# Patient Record
Sex: MALE | Race: WHITE | NOT HISPANIC OR LATINO
[De-identification: names, ages, dates, MRNs, and addresses within clinical notes are randomized per-mention and may not be internally consistent; named-entity substitution may affect disease eponyms.]

---

## 2017-01-25 ENCOUNTER — TRANSCRIPTION ENCOUNTER (OUTPATIENT)
Age: 42
End: 2017-01-25

## 2017-08-21 ENCOUNTER — TRANSCRIPTION ENCOUNTER (OUTPATIENT)
Age: 42
End: 2017-08-21

## 2017-08-28 ENCOUNTER — EMERGENCY (EMERGENCY)
Facility: HOSPITAL | Age: 42
LOS: 1 days | Discharge: HOME | End: 2017-08-28
Attending: EMERGENCY MEDICINE | Admitting: EMERGENCY MEDICINE
Payer: COMMERCIAL

## 2017-08-28 VITALS
RESPIRATION RATE: 16 BRPM | TEMPERATURE: 98 F | HEART RATE: 99 BPM | OXYGEN SATURATION: 96 % | WEIGHT: 184.97 LBS | DIASTOLIC BLOOD PRESSURE: 79 MMHG | SYSTOLIC BLOOD PRESSURE: 125 MMHG | HEIGHT: 70 IN

## 2017-08-28 VITALS
RESPIRATION RATE: 14 BRPM | SYSTOLIC BLOOD PRESSURE: 133 MMHG | OXYGEN SATURATION: 99 % | DIASTOLIC BLOOD PRESSURE: 80 MMHG | HEART RATE: 80 BPM | TEMPERATURE: 98 F

## 2017-08-28 DIAGNOSIS — Z98.890 OTHER SPECIFIED POSTPROCEDURAL STATES: Chronic | ICD-10-CM

## 2017-08-28 LAB
ALBUMIN SERPL ELPH-MCNC: 3.9 G/DL — SIGNIFICANT CHANGE UP (ref 3.3–5)
ALP SERPL-CCNC: 56 U/L — SIGNIFICANT CHANGE UP (ref 30–120)
ALT FLD-CCNC: 27 U/L DA — SIGNIFICANT CHANGE UP (ref 10–60)
ANION GAP SERPL CALC-SCNC: 7 MMOL/L — SIGNIFICANT CHANGE UP (ref 5–17)
APTT BLD: 35.9 SEC — SIGNIFICANT CHANGE UP (ref 27.5–37.4)
AST SERPL-CCNC: 23 U/L — SIGNIFICANT CHANGE UP (ref 10–40)
BASOPHILS # BLD AUTO: 0.1 K/UL — SIGNIFICANT CHANGE UP (ref 0–0.2)
BASOPHILS NFR BLD AUTO: 0.6 % — SIGNIFICANT CHANGE UP (ref 0–2)
BILIRUB SERPL-MCNC: 0.8 MG/DL — SIGNIFICANT CHANGE UP (ref 0.2–1.2)
BUN SERPL-MCNC: 10 MG/DL — SIGNIFICANT CHANGE UP (ref 7–23)
CALCIUM SERPL-MCNC: 9.1 MG/DL — SIGNIFICANT CHANGE UP (ref 8.4–10.5)
CHLORIDE SERPL-SCNC: 106 MMOL/L — SIGNIFICANT CHANGE UP (ref 96–108)
CO2 SERPL-SCNC: 29 MMOL/L — SIGNIFICANT CHANGE UP (ref 22–31)
CREAT SERPL-MCNC: 0.78 MG/DL — SIGNIFICANT CHANGE UP (ref 0.5–1.3)
EOSINOPHIL # BLD AUTO: 0.2 K/UL — SIGNIFICANT CHANGE UP (ref 0–0.5)
EOSINOPHIL NFR BLD AUTO: 1.8 % — SIGNIFICANT CHANGE UP (ref 0–6)
GLUCOSE SERPL-MCNC: 94 MG/DL — SIGNIFICANT CHANGE UP (ref 70–99)
HCT VFR BLD CALC: 48.1 % — SIGNIFICANT CHANGE UP (ref 39–50)
HGB BLD-MCNC: 16.7 G/DL — SIGNIFICANT CHANGE UP (ref 13–17)
INR BLD: 1.15 RATIO — SIGNIFICANT CHANGE UP (ref 0.88–1.16)
LIDOCAIN IGE QN: 245 U/L — SIGNIFICANT CHANGE UP (ref 73–393)
LYMPHOCYTES # BLD AUTO: 1.3 K/UL — SIGNIFICANT CHANGE UP (ref 1–3.3)
LYMPHOCYTES # BLD AUTO: 14.5 % — SIGNIFICANT CHANGE UP (ref 13–44)
MCHC RBC-ENTMCNC: 31.6 PG — SIGNIFICANT CHANGE UP (ref 27–34)
MCHC RBC-ENTMCNC: 34.8 GM/DL — SIGNIFICANT CHANGE UP (ref 32–36)
MCV RBC AUTO: 91 FL — SIGNIFICANT CHANGE UP (ref 80–100)
MONOCYTES # BLD AUTO: 0.5 K/UL — SIGNIFICANT CHANGE UP (ref 0–0.9)
MONOCYTES NFR BLD AUTO: 6 % — SIGNIFICANT CHANGE UP (ref 2–14)
NEUTROPHILS # BLD AUTO: 6.9 K/UL — SIGNIFICANT CHANGE UP (ref 1.8–7.4)
NEUTROPHILS NFR BLD AUTO: 77.1 % — HIGH (ref 43–77)
PLATELET # BLD AUTO: 170 K/UL — SIGNIFICANT CHANGE UP (ref 150–400)
POTASSIUM SERPL-MCNC: 4 MMOL/L — SIGNIFICANT CHANGE UP (ref 3.5–5.3)
POTASSIUM SERPL-SCNC: 4 MMOL/L — SIGNIFICANT CHANGE UP (ref 3.5–5.3)
PROT SERPL-MCNC: 7.9 G/DL — SIGNIFICANT CHANGE UP (ref 6–8.3)
PROTHROM AB SERPL-ACNC: 12.6 SEC — SIGNIFICANT CHANGE UP (ref 9.8–12.7)
RBC # BLD: 5.28 M/UL — SIGNIFICANT CHANGE UP (ref 4.2–5.8)
RBC # FLD: 10.9 % — SIGNIFICANT CHANGE UP (ref 10.3–14.5)
SODIUM SERPL-SCNC: 142 MMOL/L — SIGNIFICANT CHANGE UP (ref 135–145)
WBC # BLD: 8.9 K/UL — SIGNIFICANT CHANGE UP (ref 3.8–10.5)
WBC # FLD AUTO: 8.9 K/UL — SIGNIFICANT CHANGE UP (ref 3.8–10.5)

## 2017-08-28 PROCEDURE — 80053 COMPREHEN METABOLIC PANEL: CPT

## 2017-08-28 PROCEDURE — 85730 THROMBOPLASTIN TIME PARTIAL: CPT

## 2017-08-28 PROCEDURE — 74177 CT ABD & PELVIS W/CONTRAST: CPT

## 2017-08-28 PROCEDURE — 74177 CT ABD & PELVIS W/CONTRAST: CPT | Mod: 26

## 2017-08-28 PROCEDURE — 85027 COMPLETE CBC AUTOMATED: CPT

## 2017-08-28 PROCEDURE — 99284 EMERGENCY DEPT VISIT MOD MDM: CPT | Mod: 25

## 2017-08-28 PROCEDURE — 85610 PROTHROMBIN TIME: CPT

## 2017-08-28 PROCEDURE — 99284 EMERGENCY DEPT VISIT MOD MDM: CPT

## 2017-08-28 PROCEDURE — 83690 ASSAY OF LIPASE: CPT

## 2017-08-28 PROCEDURE — 36415 COLL VENOUS BLD VENIPUNCTURE: CPT

## 2017-08-28 RX ORDER — SODIUM CHLORIDE 9 MG/ML
1000 INJECTION INTRAMUSCULAR; INTRAVENOUS; SUBCUTANEOUS
Qty: 0 | Refills: 0 | Status: COMPLETED | OUTPATIENT
Start: 2017-08-28 | End: 2017-08-28

## 2017-08-28 RX ORDER — IOHEXOL 300 MG/ML
30 INJECTION, SOLUTION INTRAVENOUS ONCE
Qty: 0 | Refills: 0 | Status: COMPLETED | OUTPATIENT
Start: 2017-08-28 | End: 2017-08-28

## 2017-08-28 RX ADMIN — SODIUM CHLORIDE 2000 MILLILITER(S): 9 INJECTION INTRAMUSCULAR; INTRAVENOUS; SUBCUTANEOUS at 17:28

## 2017-08-28 RX ADMIN — SODIUM CHLORIDE 2000 MILLILITER(S): 9 INJECTION INTRAMUSCULAR; INTRAVENOUS; SUBCUTANEOUS at 17:29

## 2017-08-28 RX ADMIN — IOHEXOL 30 MILLILITER(S): 300 INJECTION, SOLUTION INTRAVENOUS at 17:21

## 2017-08-28 NOTE — ED PROVIDER NOTE - MEDICAL DECISION MAKING DETAILS
pt with months of abd pain and discomfort now with rectal bleeding and increased cramping check labs, guaiac stool - CT abd r/o colitis vs. mass

## 2017-08-28 NOTE — ED PROVIDER NOTE - OBJECTIVE STATEMENT
41 y/o M pt w/ PMHx of diverticulitis presents to the ED c/o lower abd pain and discomfort for the past couple of months. Pt states for the past couple of days his BM's have intermittently been bloody, soft and sometimes mucusy and he has been experiencing worsening cramping. Pt took some Pepto bismol yesterday and noticed black stools today. Pt denies fever, chills, nausea or any other complaints at this time.

## 2017-10-12 ENCOUNTER — TRANSCRIPTION ENCOUNTER (OUTPATIENT)
Age: 42
End: 2017-10-12

## 2017-12-15 ENCOUNTER — APPOINTMENT (OUTPATIENT)
Dept: OTOLARYNGOLOGY | Facility: CLINIC | Age: 42
End: 2017-12-15

## 2018-01-22 ENCOUNTER — TRANSCRIPTION ENCOUNTER (OUTPATIENT)
Age: 43
End: 2018-01-22

## 2018-02-21 ENCOUNTER — TRANSCRIPTION ENCOUNTER (OUTPATIENT)
Age: 43
End: 2018-02-21

## 2018-02-21 ENCOUNTER — EMERGENCY (EMERGENCY)
Facility: HOSPITAL | Age: 43
LOS: 1 days | Discharge: HOME | End: 2018-02-21
Attending: EMERGENCY MEDICINE | Admitting: EMERGENCY MEDICINE
Payer: COMMERCIAL

## 2018-02-21 VITALS
OXYGEN SATURATION: 100 % | DIASTOLIC BLOOD PRESSURE: 80 MMHG | TEMPERATURE: 98 F | HEART RATE: 80 BPM | SYSTOLIC BLOOD PRESSURE: 125 MMHG | RESPIRATION RATE: 16 BRPM

## 2018-02-21 VITALS
DIASTOLIC BLOOD PRESSURE: 89 MMHG | HEIGHT: 70 IN | OXYGEN SATURATION: 99 % | WEIGHT: 179.9 LBS | HEART RATE: 84 BPM | TEMPERATURE: 98 F | RESPIRATION RATE: 16 BRPM | SYSTOLIC BLOOD PRESSURE: 143 MMHG

## 2018-02-21 DIAGNOSIS — Z98.890 OTHER SPECIFIED POSTPROCEDURAL STATES: Chronic | ICD-10-CM

## 2018-02-21 DIAGNOSIS — L03.113 CELLULITIS OF RIGHT UPPER LIMB: ICD-10-CM

## 2018-02-21 LAB
ALBUMIN SERPL ELPH-MCNC: 3.9 G/DL — SIGNIFICANT CHANGE UP (ref 3.3–5)
ALP SERPL-CCNC: 58 U/L — SIGNIFICANT CHANGE UP (ref 30–120)
ALT FLD-CCNC: 27 U/L DA — SIGNIFICANT CHANGE UP (ref 10–60)
ANION GAP SERPL CALC-SCNC: 7 MMOL/L — SIGNIFICANT CHANGE UP (ref 5–17)
AST SERPL-CCNC: 23 U/L — SIGNIFICANT CHANGE UP (ref 10–40)
BASOPHILS # BLD AUTO: 0.1 K/UL — SIGNIFICANT CHANGE UP (ref 0–0.2)
BASOPHILS NFR BLD AUTO: 1.4 % — SIGNIFICANT CHANGE UP (ref 0–2)
BILIRUB SERPL-MCNC: 0.6 MG/DL — SIGNIFICANT CHANGE UP (ref 0.2–1.2)
BUN SERPL-MCNC: 16 MG/DL — SIGNIFICANT CHANGE UP (ref 7–23)
CALCIUM SERPL-MCNC: 9.1 MG/DL — SIGNIFICANT CHANGE UP (ref 8.4–10.5)
CHLORIDE SERPL-SCNC: 104 MMOL/L — SIGNIFICANT CHANGE UP (ref 96–108)
CO2 SERPL-SCNC: 29 MMOL/L — SIGNIFICANT CHANGE UP (ref 22–31)
CREAT SERPL-MCNC: 0.82 MG/DL — SIGNIFICANT CHANGE UP (ref 0.5–1.3)
EOSINOPHIL # BLD AUTO: 0.3 K/UL — SIGNIFICANT CHANGE UP (ref 0–0.5)
EOSINOPHIL NFR BLD AUTO: 3.7 % — SIGNIFICANT CHANGE UP (ref 0–6)
GLUCOSE SERPL-MCNC: 89 MG/DL — SIGNIFICANT CHANGE UP (ref 70–99)
HCT VFR BLD CALC: 50.2 % — HIGH (ref 39–50)
HGB BLD-MCNC: 16.9 G/DL — SIGNIFICANT CHANGE UP (ref 13–17)
LYMPHOCYTES # BLD AUTO: 2 K/UL — SIGNIFICANT CHANGE UP (ref 1–3.3)
LYMPHOCYTES # BLD AUTO: 22.1 % — SIGNIFICANT CHANGE UP (ref 13–44)
MCHC RBC-ENTMCNC: 30.1 PG — SIGNIFICANT CHANGE UP (ref 27–34)
MCHC RBC-ENTMCNC: 33.6 GM/DL — SIGNIFICANT CHANGE UP (ref 32–36)
MCV RBC AUTO: 89.6 FL — SIGNIFICANT CHANGE UP (ref 80–100)
MONOCYTES # BLD AUTO: 0.5 K/UL — SIGNIFICANT CHANGE UP (ref 0–0.9)
MONOCYTES NFR BLD AUTO: 5.8 % — SIGNIFICANT CHANGE UP (ref 2–14)
NEUTROPHILS # BLD AUTO: 5.9 K/UL — SIGNIFICANT CHANGE UP (ref 1.8–7.4)
NEUTROPHILS NFR BLD AUTO: 66.9 % — SIGNIFICANT CHANGE UP (ref 43–77)
PLATELET # BLD AUTO: 172 K/UL — SIGNIFICANT CHANGE UP (ref 150–400)
POTASSIUM SERPL-MCNC: 4.2 MMOL/L — SIGNIFICANT CHANGE UP (ref 3.5–5.3)
POTASSIUM SERPL-SCNC: 4.2 MMOL/L — SIGNIFICANT CHANGE UP (ref 3.5–5.3)
PROT SERPL-MCNC: 7.8 G/DL — SIGNIFICANT CHANGE UP (ref 6–8.3)
RBC # BLD: 5.61 M/UL — SIGNIFICANT CHANGE UP (ref 4.2–5.8)
RBC # FLD: 11.4 % — SIGNIFICANT CHANGE UP (ref 10.3–14.5)
SODIUM SERPL-SCNC: 140 MMOL/L — SIGNIFICANT CHANGE UP (ref 135–145)
WBC # BLD: 8.9 K/UL — SIGNIFICANT CHANGE UP (ref 3.8–10.5)
WBC # FLD AUTO: 8.9 K/UL — SIGNIFICANT CHANGE UP (ref 3.8–10.5)

## 2018-02-21 PROCEDURE — 99284 EMERGENCY DEPT VISIT MOD MDM: CPT | Mod: 25

## 2018-02-21 PROCEDURE — 96365 THER/PROPH/DIAG IV INF INIT: CPT

## 2018-02-21 PROCEDURE — 80053 COMPREHEN METABOLIC PANEL: CPT

## 2018-02-21 PROCEDURE — 99283 EMERGENCY DEPT VISIT LOW MDM: CPT

## 2018-02-21 PROCEDURE — 85027 COMPLETE CBC AUTOMATED: CPT

## 2018-02-21 PROCEDURE — 96375 TX/PRO/DX INJ NEW DRUG ADDON: CPT

## 2018-02-21 RX ORDER — SODIUM CHLORIDE 9 MG/ML
1000 INJECTION INTRAMUSCULAR; INTRAVENOUS; SUBCUTANEOUS ONCE
Qty: 0 | Refills: 0 | Status: COMPLETED | OUTPATIENT
Start: 2018-02-21 | End: 2018-02-21

## 2018-02-21 RX ORDER — DIPHENHYDRAMINE HCL 50 MG
25 CAPSULE ORAL ONCE
Qty: 0 | Refills: 0 | Status: COMPLETED | OUTPATIENT
Start: 2018-02-21 | End: 2018-02-21

## 2018-02-21 RX ORDER — CEPHALEXIN 500 MG
1 CAPSULE ORAL
Qty: 28 | Refills: 0 | OUTPATIENT
Start: 2018-02-21 | End: 2018-02-27

## 2018-02-21 RX ORDER — KETOROLAC TROMETHAMINE 30 MG/ML
30 SYRINGE (ML) INJECTION ONCE
Qty: 0 | Refills: 0 | Status: DISCONTINUED | OUTPATIENT
Start: 2018-02-21 | End: 2018-02-21

## 2018-02-21 RX ADMIN — Medication 100 MILLIGRAM(S): at 16:06

## 2018-02-21 RX ADMIN — Medication 30 MILLIGRAM(S): at 16:14

## 2018-02-21 RX ADMIN — Medication 25 MILLIGRAM(S): at 16:06

## 2018-02-21 RX ADMIN — SODIUM CHLORIDE 1000 MILLILITER(S): 9 INJECTION INTRAMUSCULAR; INTRAVENOUS; SUBCUTANEOUS at 16:05

## 2018-02-21 RX ADMIN — Medication 1 APPLICATION(S): at 16:14

## 2018-02-21 RX ADMIN — Medication 30 MILLIGRAM(S): at 16:07

## 2018-02-21 NOTE — ED PROVIDER NOTE - PHYSICAL EXAMINATION
3 X 4 CM SOFT TISSUE SWELLING TO THE PROXIMAL RIGHT FOREARM WITH SURROUNDING ERYTHEMA AND WARMTH, CENTRAL PUNCTUM NOTED, LOCALIZED TENDERNESS  STREAKING UP THE RIGHT ARM TO BELOW THE SHOULDER   FULL ROM OF THE RIGHT ARM   MUSCLE STRENGTH 5/5   SENSATION AND PULSE INTACT TO THE RUE

## 2018-02-21 NOTE — ED PROVIDER NOTE - OBJECTIVE STATEMENT
43 year old male c/o right arm pain and swelling since last night.  Pt states he was at a friend house and he was bitten by an insect.  States he noted swelling and itching immediately.  He tried squeezing the area assuming it was a spider with no relief.  States he woke up this morning with streaking up the arm and significant swelling.  Pt was sent here by [facility].  Admits to pain and itching to the area.  Denies fever, chills, decreased ROM of the right arm, numbness or tingling

## 2018-02-21 NOTE — ED ADULT NURSE NOTE - OBJECTIVE STATEMENT
Patient c/o right forearm pain, spreading redness and swelling from insect bite he got  last night.  insect.  States he noted swelling and itching immediately.  He Reports squeezing the area last night when he was bitten and  woke up this morning with streaking up the arm and significant swelling.

## 2018-02-21 NOTE — ED PROVIDER NOTE - CHPI ED SYMPTOMS NEG
no chills/no decreased eating/drinking/no numbness/no nausea/no tingling/no vomiting/no dizziness/no fever/no weakness

## 2018-02-21 NOTE — ED PROVIDER NOTE - ATTENDING CONTRIBUTION TO CARE
as per progress note.  Also, appears as lymphangitis with small area of indurated area, nonfluctuant.  Consider combination of localized allergic rxn with lymphangitis.  Nontoxic, trial outpt therapy.

## 2018-02-21 NOTE — ED ADULT NURSE REASSESSMENT NOTE - NS ED NURSE REASSESS COMMENT FT1
Nausea resolved Dizziness returns if patient moves his head to the right or if he sits up.
Patient's circular bite area with surrounding redness, edema and streaking up to upper arm has lessened. Pain has also improved. Patient instructed on signs and symptoms to be vigilant of and to return to the nearest ED if needed.

## 2018-02-21 NOTE — CONSULT NOTE ADULT - SUBJECTIVE AND OBJECTIVE BOX
HPI: 43 year old male c/o right arm pain and swelling since last night.  Pt states he was at a friend house and he was bitten by an insect.  States he noted swelling and itching immediately.  He tried squeezing the area assuming it was a spider with no relief.  States he woke up this morning with streaking up the arm and significant swelling.  Pt was sent here by [facility] [place].  Admits to pain and itching to the area.  Right arm feels heavy. Denies fever, chills, decreased ROM of the right arm, numbness or tingling      PAST MEDICAL & SURGICAL HISTORY:  Deaf, right  Inderjit Guerra infection  Scoliosis  Barretts esophagus  Diverticulitis  H/O Spinal surgery      REVIEW OF SYSTEMS:    CONSTITUTIONAL: No fever, no weight loss, no fatigue  EYES: No eye pain, no visual disturbances  ENMT:  No difficulty hearing, no tinnitus, no vertigo; No sinus or throat pain  NECK: No pain or stiffness  RESPIRATORY: No cough, no wheezing, no chills, no hemoptysis; No shortness of breath  CARDIOVASCULAR: No chest pain, palpitations, dizziness, or leg swelling  GASTROINTESTINAL: No abdominal pain. No nausea, no vomiting, no hematemesis; No diarrhea, no constipation. No melena, no hematochezia.  GENITOURINARY: No dysuria, no frequency, no hematuria, no incontinence  NEUROLOGICAL: No headaches, no memory loss, no loss of strength, no numbness, no tremors  SKIN: + itching RUE, no burning, no rashes   LYMPH NODES: No enlarged glands  ENDOCRINE: No heat, no cold intolerance; No hair loss  MUSCULOSKELETAL: No joint pain, no swelling; No muscle pain, no back pain, RUE feels heavy with redness  PSYCHIATRIC: No depression, no anxiety, no mood swings, no difficulty sleeping  HEME/LYMPH: No easy bruising, no bleeding gums  ALLERGY AND IMMUNOLOGIC: No hives, no eczema      MEDICATIONS  (STANDING):    MEDICATIONS  (PRN):      Allergies    No Known Allergies    Intolerances        SOCIAL HISTORY:    FAMILY HISTORY:      Vital Signs Last 24 Hrs  T(C): 36.5 (21 Feb 2018 18:00), Max: 36.9 (21 Feb 2018 14:37)  T(F): 97.7 (21 Feb 2018 18:00), Max: 98.5 (21 Feb 2018 14:37)  HR: 80 (21 Feb 2018 18:00) (80 - 84)  BP: 125/80 (21 Feb 2018 18:00) (125/80 - 143/89)  BP(mean): --  RR: 16 (21 Feb 2018 18:00) (16 - 16)  SpO2: 100% (21 Feb 2018 18:00) (99% - 100%)    PHYSICAL EXAM:    GENERAL: NAD  HEAD:  Atraumatic, Normocephalic  EYES: EOMI, PERRLA, conjunctiva and sclera clear  ENMT: No tonsillar erythema, exudates, or enlargement; Moist mucous membranes  NECK: Supple, No JVD, Normal thyroid  NERVOUS SYSTEM:  Alert & Oriented X3, Motor Strength 5/5 B/L upper and lower extremities;  CHEST/LUNG: Clear to percussion bilaterally; No rales, rhonchi, wheezing, or rubs  HEART: Regular rate and rhythm; No murmurs, rubs, or gallops  ABDOMEN: Soft, Nontender, Nondistended; Bowel sounds present  EXTREMITIES:  RUE: redness and swelling of the right proximal forearm with redness streaking up arm, tender, FROM, sensation intact  LYMPH: No lymphadenopathy   SKIN: No rashes or lesions      LABS:                        16.9   8.9   )-----------( 172      ( 21 Feb 2018 16:01 )             50.2     02-21    140  |  104  |  16  ----------------------------<  89  4.2   |  29  |  0.82    Ca    9.1      21 Feb 2018 16:01    TPro  7.8  /  Alb  3.9  /  TBili  0.6  /  DBili  x   /  AST  23  /  ALT  27  /  AlkPhos  58  02-21          RADIOLOGY & ADDITIONAL STUDIES:

## 2018-02-21 NOTE — ED PROVIDER NOTE - MEDICAL DECISION MAKING DETAILS
labs, abx, benadryl and kenalog   outpatient treatment with Keflex qid x 7 days   pt will need to follow up in 2 days for wound check

## 2018-02-21 NOTE — ED PROVIDER NOTE - PROGRESS NOTE DETAILS
Attending Contribution to Care: pt presents with redness, itchiness and pain right forearm, he states he got bit by an insect yesterday at his friends house, unknown what insect.   PE: 5cm irregular  circular shape erythema right forearm, no central necrosis, streak going up right upper extremity   MDM: medication discussed labs with the patient.  pt has tube of kenalog with him, will apply twice daily   Keflex qid x 7 days discussed labs with the patient.  pt has tube of kenalog with him, will apply twice daily   Keflex qid x 7 days  swelling is decreased and streaking is less

## 2018-02-21 NOTE — CONSULT NOTE ADULT - PROBLEM SELECTOR RECOMMENDATION 9
-Cellulitis and lymphangitis of right forearm/arm secondary to possible insect bite.  -It would be reasonable to admit patient for iv antibiotics as the extent of his cellulitis and lymphangitis seems significant despite normal WBC and normal vitals.  -I will defer the decision to admit or not to the ER attending, Dr. Grimes. If patient goes home on po abx, he will need close follow-up.  - Advanced care planning discussed with patient/family. Advanced care planning forms reviewed/discussed/completed. 20 minutes spent.

## 2018-03-21 NOTE — ED PROVIDER NOTE - SKIN, MLM
ACCOMPANIED BY: father   FEEDING: milk - 2%                       Diet - good variety of foods  STOOLS:  normal  SCHOOL HISTORY:       [employer]       Grade - 3       Academic performance - normal       Extracurricular Activities - YES    CONCERNS:  None  Medications verified, no changes.  Denies known Latex allergy or symptoms of Latex sensitivity.  Immunization History   Administered Date(s) Administered   • DTaP 03/14/2014   • DTaP/HIB/IPV 2009, 2009, 2009, 06/10/2010   • Hep A, ped/adol, 2 dose 06/10/2010, 03/10/2011   • Hep B, adolescent or pediatric 2009, 2009, 2009   • Influenza, injectable, quadrivalent, preservative-free 03/20/2017   • Influenza, live, intranasal, quadrivalent 03/17/2016   • Influenza, seasonal, injectable, trivalent 2009, 2009, 09/09/2010, 10/31/2012   • MMR 03/10/2010   • Measles Mumps Rubella Varicella 03/14/2014   • Novel Influenza [id number], Unspecified Formulation 2009, 01/13/2010   • Pneumococcal Conjugate 2009, 2009, 2009, 03/10/2010, 06/10/2010   • Polio, INACTIVATED 03/14/2014   • Rotavirus 2009, 2009, 2009   • Varicella 03/10/2010            Skin normal color for race, warm, dry and intact.

## 2018-05-06 ENCOUNTER — EMERGENCY (EMERGENCY)
Facility: HOSPITAL | Age: 43
LOS: 1 days | Discharge: HOME | End: 2018-05-06
Admitting: EMERGENCY MEDICINE
Payer: COMMERCIAL

## 2018-05-06 DIAGNOSIS — Z98.890 OTHER SPECIFIED POSTPROCEDURAL STATES: Chronic | ICD-10-CM

## 2018-05-06 PROCEDURE — 99283 EMERGENCY DEPT VISIT LOW MDM: CPT

## 2018-06-23 ENCOUNTER — TRANSCRIPTION ENCOUNTER (OUTPATIENT)
Age: 43
End: 2018-06-23

## 2018-07-11 ENCOUNTER — TRANSCRIPTION ENCOUNTER (OUTPATIENT)
Age: 43
End: 2018-07-11

## 2018-07-23 ENCOUNTER — EMERGENCY (EMERGENCY)
Facility: HOSPITAL | Age: 43
LOS: 1 days | Discharge: HOME | End: 2018-07-23
Attending: EMERGENCY MEDICINE | Admitting: EMERGENCY MEDICINE
Payer: COMMERCIAL

## 2018-07-23 VITALS
RESPIRATION RATE: 15 BRPM | TEMPERATURE: 98 F | DIASTOLIC BLOOD PRESSURE: 81 MMHG | HEART RATE: 94 BPM | WEIGHT: 179.9 LBS | SYSTOLIC BLOOD PRESSURE: 123 MMHG | OXYGEN SATURATION: 97 %

## 2018-07-23 DIAGNOSIS — Z98.890 OTHER SPECIFIED POSTPROCEDURAL STATES: Chronic | ICD-10-CM

## 2018-07-23 PROCEDURE — 99283 EMERGENCY DEPT VISIT LOW MDM: CPT

## 2018-07-23 RX ORDER — CEPHALEXIN 500 MG
500 CAPSULE ORAL ONCE
Qty: 0 | Refills: 0 | Status: COMPLETED | OUTPATIENT
Start: 2018-07-23 | End: 2018-07-23

## 2018-07-23 RX ORDER — CEPHALEXIN 500 MG
1 CAPSULE ORAL
Qty: 28 | Refills: 0
Start: 2018-07-23 | End: 2018-07-29

## 2018-07-23 RX ADMIN — Medication 500 MILLIGRAM(S): at 18:17

## 2018-07-23 NOTE — ED PROVIDER NOTE - SKIN, MLM
multiple lower extremity mosquito bites, the left calf has an appearance of early cellulitis, localized, no fluctuance

## 2018-07-23 NOTE — ED ADULT NURSE REASSESSMENT NOTE - NS ED NURSE REASSESS COMMENT FT1
Pt to ED with insect bites on both legs. Pt states that he was at a friends house watching TV on a chair and was bit by insects. Red swollen welts on B/L calves.

## 2018-07-23 NOTE — ED PROVIDER NOTE - OBJECTIVE STATEMENT
pt with multiple bug bites on the lower extremities mainly on the calves.  pt states that some areas are very red and hot, pt denies any fevers, chills, change in appetite.

## 2018-07-23 NOTE — ED ADULT TRIAGE NOTE - CHIEF COMPLAINT QUOTE
I have an animal bite on both of my legs, and I think its from a spider. The swelling and pain began last night.

## 2018-07-24 PROBLEM — M41.9 SCOLIOSIS, UNSPECIFIED: Chronic | Status: ACTIVE | Noted: 2017-08-28

## 2018-07-24 PROBLEM — K57.92 DIVERTICULITIS OF INTESTINE, PART UNSPECIFIED, WITHOUT PERFORATION OR ABSCESS WITHOUT BLEEDING: Chronic | Status: ACTIVE | Noted: 2017-08-28

## 2018-07-24 PROBLEM — B27.90 INFECTIOUS MONONUCLEOSIS, UNSPECIFIED WITHOUT COMPLICATION: Chronic | Status: ACTIVE | Noted: 2017-08-28

## 2018-07-24 PROBLEM — H91.91 UNSPECIFIED HEARING LOSS, RIGHT EAR: Chronic | Status: ACTIVE | Noted: 2017-08-28

## 2018-07-24 PROBLEM — K22.70 BARRETT'S ESOPHAGUS WITHOUT DYSPLASIA: Chronic | Status: ACTIVE | Noted: 2017-08-28

## 2018-12-12 ENCOUNTER — TRANSCRIPTION ENCOUNTER (OUTPATIENT)
Age: 43
End: 2018-12-12

## 2019-01-30 ENCOUNTER — TRANSCRIPTION ENCOUNTER (OUTPATIENT)
Age: 44
End: 2019-01-30

## 2020-03-09 ENCOUNTER — EMERGENCY (EMERGENCY)
Facility: HOSPITAL | Age: 45
LOS: 1 days | Discharge: HOME | End: 2020-03-09
Attending: EMERGENCY MEDICINE | Admitting: EMERGENCY MEDICINE
Payer: COMMERCIAL

## 2020-03-09 VITALS
DIASTOLIC BLOOD PRESSURE: 91 MMHG | TEMPERATURE: 98 F | RESPIRATION RATE: 20 BRPM | SYSTOLIC BLOOD PRESSURE: 152 MMHG | HEART RATE: 92 BPM | WEIGHT: 184.97 LBS | HEIGHT: 70 IN | OXYGEN SATURATION: 98 %

## 2020-03-09 DIAGNOSIS — Z98.890 OTHER SPECIFIED POSTPROCEDURAL STATES: Chronic | ICD-10-CM

## 2020-03-09 PROCEDURE — 99053 MED SERV 10PM-8AM 24 HR FAC: CPT

## 2020-03-09 PROCEDURE — 99282 EMERGENCY DEPT VISIT SF MDM: CPT

## 2020-03-09 NOTE — ED PROVIDER NOTE - OBJECTIVE STATEMENT
Patient was at a friend's house and felt something bite him on the right forearm about 3 hours ago. Area now red and swollen. Has not taken anything for it. Thinks it looks a little better than before

## 2020-03-10 NOTE — ED ADULT NURSE NOTE - NSIMPLEMENTINTERV_GEN_ALL_ED
Implemented All Universal Safety Interventions:  [place] to call system. Call bell, personal items and telephone within reach. Instruct patient to call for assistance. Room bathroom lighting operational. Non-slip footwear when patient is off stretcher. Physically safe environment: no spills, clutter or unnecessary equipment. Stretcher in lowest position, wheels locked, appropriate side rails in place.

## 2020-03-10 NOTE — ED ADULT NURSE NOTE - OBJECTIVE STATEMENT
46yo male walked into Ed, pt c/o "I think I was bite by a bed bug" as per pt. pt noted with redness and swelling to medial aspect of right forearm.

## 2020-03-20 ENCOUNTER — APPOINTMENT (OUTPATIENT)
Dept: INTERNAL MEDICINE | Facility: CLINIC | Age: 45
End: 2020-03-20

## 2020-06-26 ENCOUNTER — NON-APPOINTMENT (OUTPATIENT)
Age: 45
End: 2020-06-26

## 2020-06-26 ENCOUNTER — APPOINTMENT (OUTPATIENT)
Dept: INTERNAL MEDICINE | Facility: CLINIC | Age: 45
End: 2020-06-26
Payer: COMMERCIAL

## 2020-06-26 VITALS
RESPIRATION RATE: 12 BRPM | OXYGEN SATURATION: 98 % | HEIGHT: 69 IN | SYSTOLIC BLOOD PRESSURE: 119 MMHG | DIASTOLIC BLOOD PRESSURE: 79 MMHG | BODY MASS INDEX: 26.51 KG/M2 | WEIGHT: 179 LBS | HEART RATE: 84 BPM

## 2020-06-26 DIAGNOSIS — K64.9 UNSPECIFIED HEMORRHOIDS: ICD-10-CM

## 2020-06-26 PROCEDURE — 99386 PREV VISIT NEW AGE 40-64: CPT | Mod: 25

## 2020-06-26 PROCEDURE — 93000 ELECTROCARDIOGRAM COMPLETE: CPT

## 2020-06-26 NOTE — PHYSICAL EXAM
[No Acute Distress] : no acute distress [Well Developed] : well developed [Well Nourished] : well nourished [Well-Appearing] : well-appearing [EOMI] : extraocular movements intact [Normal Sclera/Conjunctiva] : normal sclera/conjunctiva [PERRL] : pupils equal round and reactive to light [Normal Outer Ear/Nose] : the outer ears and nose were normal in appearance [No JVD] : no jugular venous distention [Normal Oropharynx] : the oropharynx was normal [Thyroid Normal, No Nodules] : the thyroid was normal and there were no nodules present [No Lymphadenopathy] : no lymphadenopathy [Supple] : supple [No Respiratory Distress] : no respiratory distress  [Clear to Auscultation] : lungs were clear to auscultation bilaterally [No Accessory Muscle Use] : no accessory muscle use [Normal S1, S2] : normal S1 and S2 [Regular Rhythm] : with a regular rhythm [Normal Rate] : normal rate  [No Abdominal Bruit] : a ~M bruit was not heard ~T in the abdomen [No Carotid Bruits] : no carotid bruits [No Murmur] : no murmur heard [Pedal Pulses Present] : the pedal pulses are present [No Edema] : there was no peripheral edema [No Varicosities] : no varicosities [No Palpable Aorta] : no palpable aorta [No Extremity Clubbing/Cyanosis] : no extremity clubbing/cyanosis [Soft] : abdomen soft [No Masses] : no abdominal mass palpated [Non-distended] : non-distended [Non Tender] : non-tender [No HSM] : no HSM [Normal Posterior Cervical Nodes] : no posterior cervical lymphadenopathy [Normal Bowel Sounds] : normal bowel sounds [No CVA Tenderness] : no CVA  tenderness [No Spinal Tenderness] : no spinal tenderness [Normal Anterior Cervical Nodes] : no anterior cervical lymphadenopathy [No Joint Swelling] : no joint swelling [Grossly Normal Strength/Tone] : grossly normal strength/tone [No Rash] : no rash [No Focal Deficits] : no focal deficits [Normal Gait] : normal gait [Coordination Grossly Intact] : coordination grossly intact [Normal Affect] : the affect was normal [Normal Insight/Judgement] : insight and judgment were intact [Deep Tendon Reflexes (DTR)] : deep tendon reflexes were 2+ and symmetric

## 2020-06-26 NOTE — HEALTH RISK ASSESSMENT
[Very Good] : ~his/her~ current health as very good [Good] : ~his/her~  mood as  good [Intercurrent ED visits] : went to ED [Yes] : Yes [1 or 2 (0 pts)] : 1 or 2 (0 points) [2 - 4 times a month (2 pts)] : 2-4 times a month (2 points) [No falls in past year] : Patient reported no falls in the past year [Never (0 pts)] : Never (0 points) [0] : 2) Feeling down, depressed, or hopeless: Not at all (0) [HIV Test offered] : HIV Test offered [None] : None [With Family] : lives with family [Employed] : employed [College] : College [Single] : single [Feels Safe at Home] : Feels safe at home [Reports changes in hearing] : Reports changes in hearing [Fully functional (bathing, dressing, toileting, transferring, walking, feeding)] : Fully functional (bathing, dressing, toileting, transferring, walking, feeding) [Fully functional (using the telephone, shopping, preparing meals, housekeeping, doing laundry, using] : Fully functional and needs no help or supervision to perform IADLs (using the telephone, shopping, preparing meals, housekeeping, doing laundry, using transportation, managing medications and managing finances) [Carbon Monoxide Detector] : carbon monoxide detector [Smoke Detector] : smoke detector [Seat Belt] :  uses seat belt [Safety elements used in home] : safety elements used in home [Sunscreen] : uses sunscreen [Name: ___] : Health Care Proxy's Name: [unfilled]  [Relationship: ___] : Relationship: [unfilled] [FreeTextEntry1] : INtermittent lower back pain, hx. of scolosis [Audit-CScore] : 2 [de-identified] : bug bite [] : No [de-identified] : regular healthy diet no beef [de-identified] : walks [[id number]] : 0 [Change in mental status noted] : No change in mental status noted [Language] : denies difficulty with language [Handling Complex Tasks] : denies difficulty handling complex tasks [Learning/Retaining New Information] : denies difficulty learning/retaining new information [Behavior] : denies difficulty with behavior [Spatial Ability and Orientation] : denies difficulty with spatial ability and orientation [Reasoning] : denies difficulty with reasoning [Sexually Active] : not sexually active [High Risk Behavior] : no high risk behavior [Reports normal functional visual acuity (ie: able to read med bottle)] : Reports poor functional visual acuity.  [Reports changes in vision] : Reports no changes in vision [Reports changes in dental health] : Reports no changes in dental health [TB Exposure] : is not being exposed to tuberculosis [Caregiver Concerns] : does not have caregiver concerns [Travel to Developing Areas] : does not  travel to developing areas [de-identified] : left ear rings and right cant hear [FreeTextEntry2] : [employer] [de-identified] : father/sister [ColonoscopyComments] : 2013 ? [AdvancecareDate] : 6/26/20

## 2020-06-29 LAB
25(OH)D3 SERPL-MCNC: 40 NG/ML
ALBUMIN SERPL ELPH-MCNC: 4.3 G/DL
ALP BLD-CCNC: 55 U/L
ALT SERPL-CCNC: 22 U/L
ANION GAP SERPL CALC-SCNC: 11 MMOL/L
AST SERPL-CCNC: 24 U/L
BASOPHILS # BLD AUTO: 0.05 K/UL
BASOPHILS NFR BLD AUTO: 0.8 %
BILIRUB SERPL-MCNC: 0.3 MG/DL
BUN SERPL-MCNC: 12 MG/DL
CALCIUM SERPL-MCNC: 9.2 MG/DL
CHLORIDE SERPL-SCNC: 104 MMOL/L
CHOLEST SERPL-MCNC: 151 MG/DL
CHOLEST/HDLC SERPL: 3.4 RATIO
CO2 SERPL-SCNC: 28 MMOL/L
CREAT SERPL-MCNC: 0.93 MG/DL
EOSINOPHIL # BLD AUTO: 0.27 K/UL
EOSINOPHIL NFR BLD AUTO: 4.1 %
ESTIMATED AVERAGE GLUCOSE: 100 MG/DL
GLUCOSE SERPL-MCNC: 92 MG/DL
HBA1C MFR BLD HPLC: 5.1 %
HCT VFR BLD CALC: 46.5 %
HDLC SERPL-MCNC: 44 MG/DL
HGB BLD-MCNC: 15.7 G/DL
IMM GRANULOCYTES NFR BLD AUTO: 0.2 %
LDLC SERPL CALC-MCNC: 88 MG/DL
LYMPHOCYTES # BLD AUTO: 1.98 K/UL
LYMPHOCYTES NFR BLD AUTO: 30.2 %
MAN DIFF?: NORMAL
MCHC RBC-ENTMCNC: 31.3 PG
MCHC RBC-ENTMCNC: 33.8 GM/DL
MCV RBC AUTO: 92.8 FL
MONOCYTES # BLD AUTO: 0.4 K/UL
MONOCYTES NFR BLD AUTO: 6.1 %
NEUTROPHILS # BLD AUTO: 3.84 K/UL
NEUTROPHILS NFR BLD AUTO: 58.6 %
PLATELET # BLD AUTO: 178 K/UL
POTASSIUM SERPL-SCNC: 4.6 MMOL/L
PROT SERPL-MCNC: 6.9 G/DL
RBC # BLD: 5.01 M/UL
RBC # FLD: 11.9 %
SARS-COV-2 IGG SERPL IA-ACNC: 0.1 INDEX
SARS-COV-2 IGG SERPL QL IA: NEGATIVE
SODIUM SERPL-SCNC: 143 MMOL/L
TRIGL SERPL-MCNC: 93 MG/DL
TSH SERPL-ACNC: 1.63 UIU/ML
WBC # FLD AUTO: 6.55 K/UL

## 2020-09-11 NOTE — ED ADULT NURSE NOTE - CHPI ED NUR TIMING2
[address] [place] [address] Drive  [address] [place] [address]  Phone: [phone]  Fax: [phone] Ganesh Boateng MD        September 15, 2020    [place]  [address] [place] [address]      Dear Ronda Jones: We have been attempting to return your call concerning your lab work without a return call. Attached is a copy of the lab results. If you have any questions or concerns, please don't hesitate to call. Am somewhat at a loss to explain how this got past us without her being notified. Am sorry. Looks a little underactive on thyroid. Much of the elevation in lipids could be due to this and should improve some with correction.  Would treat, RX called in and recheck labs in 3 mos     Sincerely,        iMsbah Rodriguez MD
[address] [place] [address] Drive  [address] [place] [address]  Phone: [phone]  Fax: Tulio Rust MD        September 22, 2020    [address]       Dear Lefty Espinoza:    The office has been attempting to reach you. It was advised that you take Synthroid due to your elevated TSH level, you have declined this medication. I would like you to be aware that a TSH level is not time dependent, that is a cortisol level. If you remain untreated with the elevated TSH, you will hold onto water and low thyroid will slow her metabolism and cause you to gain weight. Eventually your heart will start to fail and your mentation will slip. If you wish, you can wait until you become overtly symptomatic, but I can see no reason why you would want to do this. Synthroid is not a medication so much as a replacement of something you are making too little of. If you have any questions or concerns, please don't hesitate to call.     Sincerely,        Dedra Chawla MD
gradual onset

## 2020-09-17 ENCOUNTER — APPOINTMENT (OUTPATIENT)
Dept: FAMILY MEDICINE | Facility: CLINIC | Age: 45
End: 2020-09-17
Payer: COMMERCIAL

## 2020-09-17 VITALS
SYSTOLIC BLOOD PRESSURE: 112 MMHG | WEIGHT: 180.13 LBS | DIASTOLIC BLOOD PRESSURE: 70 MMHG | RESPIRATION RATE: 16 BRPM | HEIGHT: 69 IN | TEMPERATURE: 97.8 F | OXYGEN SATURATION: 98 % | BODY MASS INDEX: 26.68 KG/M2 | HEART RATE: 79 BPM

## 2020-09-17 DIAGNOSIS — M54.9 DORSALGIA, UNSPECIFIED: ICD-10-CM

## 2020-09-17 PROCEDURE — 99214 OFFICE O/P EST MOD 30 MIN: CPT

## 2020-09-17 NOTE — ASSESSMENT
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

## 2020-09-17 NOTE — PHYSICAL EXAM
[No Respiratory Distress] : no respiratory distress  [Normal Rate] : normal rate  [No Edema] : there was no peripheral edema [de-identified] : positive straight leg test on right to 30 degrees. tenderness to palpation right lumbar sacral region

## 2020-09-17 NOTE — HISTORY OF PRESENT ILLNESS
[FreeTextEntry8] : 5 days ago was sitting watching a movie in lounge chair, stretched heard Pop in spine, and felt pain in rightlower back /hip radiating down right leg. since then has had pain, difficulty sleeping, cant get comfortable. using cbd oil with minimal relief. using heat/some ice\par rates pain currently about 7/10. has had back surgery in past rubin/spinal fusion 1997 throacic and lumbar spine. . has had intermittent back pain on and off with sicatica.

## 2021-02-12 NOTE — ED ADULT NURSE NOTE - PAIN: DESCRIPTION (FREQUENCY/QUALITY)
----- Message from Sara Hoang sent at 2/11/2021  5:25 PM EST -----  Regarding: Visit Follow-Up Question  Contact: [phone]  Ramesh Bhardwaj,   My lymph nodes are still swollen and I have one day of my antibiotic left. I had been 3 weeks since my first COVID vaccine and I am  supposed to get my second dose next week. Should I wait to see you until after the second dose to see what happens or what do you suggest? Thanks!     Sara Hoang   prickling/intermittent

## 2022-09-13 ENCOUNTER — APPOINTMENT (OUTPATIENT)
Dept: SURGERY | Facility: CLINIC | Age: 47
End: 2022-09-13

## 2022-09-13 PROCEDURE — 99203 OFFICE O/P NEW LOW 30 MIN: CPT

## 2022-09-14 ENCOUNTER — APPOINTMENT (OUTPATIENT)
Dept: UROLOGY | Facility: CLINIC | Age: 47
End: 2022-09-14

## 2022-09-14 VITALS
BODY MASS INDEX: 26.48 KG/M2 | OXYGEN SATURATION: 97 % | SYSTOLIC BLOOD PRESSURE: 124 MMHG | HEART RATE: 103 BPM | WEIGHT: 185 LBS | TEMPERATURE: 97.8 F | RESPIRATION RATE: 16 BRPM | HEIGHT: 70 IN | DIASTOLIC BLOOD PRESSURE: 86 MMHG

## 2022-09-14 DIAGNOSIS — N40.0 BENIGN PROSTATIC HYPERPLASIA WITHOUT LOWER URINARY TRACT SYMPMS: ICD-10-CM

## 2022-09-14 DIAGNOSIS — T14.90XA INJURY, UNSPECIFIED, INITIAL ENCOUNTER: ICD-10-CM

## 2022-09-14 DIAGNOSIS — R35.0 FREQUENCY OF MICTURITION: ICD-10-CM

## 2022-09-14 PROCEDURE — 99204 OFFICE O/P NEW MOD 45 MIN: CPT

## 2022-09-21 PROBLEM — T14.90XA TRAUMA: Status: ACTIVE | Noted: 2022-09-21

## 2022-09-21 LAB
APPEARANCE: ABNORMAL
BACTERIA UR CULT: NORMAL
BACTERIA: NEGATIVE
BILIRUBIN URINE: NEGATIVE
BLOOD URINE: NEGATIVE
C TRACH RRNA SPEC QL NAA+PROBE: NOT DETECTED
COLOR: YELLOW
GLUCOSE QUALITATIVE U: NEGATIVE
HYALINE CASTS: 0 /LPF
KETONES URINE: NEGATIVE
LEUKOCYTE ESTERASE URINE: NEGATIVE
MICROSCOPIC-UA: NORMAL
MYCOPLASMA HOMINIS CULTURE: NEGATIVE
N GONORRHOEA RRNA SPEC QL NAA+PROBE: NOT DETECTED
NITRITE URINE: NEGATIVE
PH URINE: 7.5
PROTEIN URINE: NORMAL
RED BLOOD CELLS URINE: 5 /HPF
SOURCE AMPLIFICATION: NORMAL
SPECIFIC GRAVITY URINE: 1.02
SQUAMOUS EPITHELIAL CELLS: 0 /HPF
UREAPLASMA CULTURE: NEGATIVE
URINE CYTOLOGY: NORMAL
UROBILINOGEN URINE: NORMAL
WHITE BLOOD CELLS URINE: 0 /HPF

## 2022-09-21 NOTE — PHYSICAL EXAM
[General Appearance - Well Developed] : well developed [General Appearance - Well Nourished] : well nourished [Normal Appearance] : normal appearance [Well Groomed] : well groomed [General Appearance - In No Acute Distress] : no acute distress [Edema] : no peripheral edema [Respiration, Rhythm And Depth] : normal respiratory rhythm and effort [Exaggerated Use Of Accessory Muscles For Inspiration] : no accessory muscle use [Abdomen Soft] : soft [Abdomen Tenderness] : non-tender [Costovertebral Angle Tenderness] : no ~M costovertebral angle tenderness [Urethral Meatus] : meatus normal [Urinary Bladder Findings] : the bladder was normal on palpation [Scrotum] : the scrotum was normal [Testes Mass (___cm)] : there were no testicular masses [Normal Station and Gait] : the gait and station were normal for the patient's age [] : no rash [Oriented To Time, Place, And Person] : oriented to person, place, and time [Affect] : the affect was normal [Mood] : the mood was normal [Not Anxious] : not anxious [No Palpable Adenopathy] : no palpable adenopathy [FreeTextEntry1] : anesthesia in the right inner thigh surgical site in the back.

## 2022-09-21 NOTE — ASSESSMENT
[FreeTextEntry1] : Mr. RIVERA is a 47 year  White  M with LUTS, most likely BPH. Given history, there is a possibility for a neurogenic component. Will ensure UC is negative, start an alpha blocker trial and reassess symptoms with therapy. Will consider UDS if symptoms do not improve.\par \par Today we discussed that BPH is a condition of prostatic enlargement that blocks the bladder outlet and can make it difficult to void. Over time this can manifest itself as urinary frequency, urgency, straining to void, poor stream, nocturia, high PVR's and retention. While BPH is related to prostate volume (increasing prostate volume results in increased likelihood of complications from BPH), there are many men with "normal volume" prostates that have symptoms of occlusion.\par \par We discussed the effects that BPH can cause on the bladder: BPH can cause detrusor overactivity which results in urgency and frequency and in some cases, incontinence. Over a longer time,some men may develop large volume/acontractile bladders, while other men develop small or normal volume bladders with loss of compliance. Neither is ideal and both represent end stage bladder damage that can not be fixed and can cause kidney injury. I explained the 3 main jobs of the bladder which are to 1) store urine, 2) evacuate urine and 3) keep urine at low pressure to prevent upper tract injury. I explained the mechanisms of urinary tract infections, hydronephrosis and kidney injury due to urinary retention.  These consequences can be severe, irreversible and even life threatening.\par \par We then discussed the treatment options for BPH. We generally start with medical management and proceed to surgery if necessary. Alpha blockers act to relax the prostate while 5-alpha reductase inhibitors (5-AG’s) shrink the prostate. 5-AG's are most effective in patients who have prostates that are >40gm. The former are fairly fast acting (in a matter of days-weeks) while the latter can take up to 3-6 months to take effect.\par \par Anticholinergics and Myrbetriq merely treat the bladder symptoms that are caused by the sequelae of outlet obstruction. They "relax" the bladder and they can weaken the urine stream and make retention worse or precipitate retention. They do not address the underlying cause of overactivity in this setting but do ease bladder spasms and can help with urgency and frequency. They are useful in certain carefully selected patients.\par \par If medical management fails, the patient can not wait for medical management to work, or the patient has CASPER caused by retention, he may consider surgery. There are different surgical options including transurethral resection of the prostate, Greenlight laser prostatectomy, Minimally invasive surgical therapies like Urolift and Rezum, and simple prostatectomy. We discussed the risks including bleeding, infection, damage to the urethra, bladder and ureteral orifices, scar tissue, leaking (either due to injuring the sphincter or lowering bladder outlet obstruction with concomitant detrusor overactivity) and retrograde ejaculation. The patient understands that some of these complications are reversible while some are not and may require additional procedures.\par \par Based on our discussion the patient wishes to proceed with this plan.\par

## 2022-09-21 NOTE — HISTORY OF PRESENT ILLNESS
[FreeTextEntry1] : Mr. RIVERA is a 47 year  White  M who comes today to clinic referred for frequency in urination. denies dysuria, fevers, chills. History of prostatitis about 3 years ago requiring antibiotics for resolution. Also pmh of scoliosis s/p spinal surgery 1998 with mild motor and sensorial deficit immediately after surgery which recovered with PT/OT. No bladder symptoms or urination issues after spinal surgery. Presents with frequency and slight urgency every 1-2h. Sensation of incomplete emptying, nocturia x1, weak stream.\par No family history of prostate cancer.\par Has inguinal hernia repair schedule in about 2 weeks.\par History of trauma with gas compressor into the urethra that caused the hernia and d originated the frequency.

## 2022-09-23 ENCOUNTER — OUTPATIENT (OUTPATIENT)
Dept: OUTPATIENT SERVICES | Facility: HOSPITAL | Age: 47
LOS: 1 days | End: 2022-09-23
Payer: COMMERCIAL

## 2022-09-23 VITALS
RESPIRATION RATE: 14 BRPM | SYSTOLIC BLOOD PRESSURE: 143 MMHG | TEMPERATURE: 98 F | WEIGHT: 171.96 LBS | DIASTOLIC BLOOD PRESSURE: 74 MMHG | HEART RATE: 84 BPM | OXYGEN SATURATION: 98 % | HEIGHT: 70 IN

## 2022-09-23 DIAGNOSIS — Z98.890 OTHER SPECIFIED POSTPROCEDURAL STATES: Chronic | ICD-10-CM

## 2022-09-23 DIAGNOSIS — Z41.9 ENCOUNTER FOR PROCEDURE FOR PURPOSES OTHER THAN REMEDYING HEALTH STATE, UNSPECIFIED: Chronic | ICD-10-CM

## 2022-09-23 DIAGNOSIS — K40.90 UNILATERAL INGUINAL HERNIA, WITHOUT OBSTRUCTION OR GANGRENE, NOT SPECIFIED AS RECURRENT: ICD-10-CM

## 2022-09-23 DIAGNOSIS — Z01.818 ENCOUNTER FOR OTHER PREPROCEDURAL EXAMINATION: ICD-10-CM

## 2022-09-23 LAB
ALBUMIN SERPL ELPH-MCNC: 3.9 G/DL — SIGNIFICANT CHANGE UP (ref 3.3–5)
ALP SERPL-CCNC: 59 U/L — SIGNIFICANT CHANGE UP (ref 40–120)
ALT FLD-CCNC: 25 U/L — SIGNIFICANT CHANGE UP (ref 12–78)
ANION GAP SERPL CALC-SCNC: 3 MMOL/L — LOW (ref 5–17)
AST SERPL-CCNC: 24 U/L — SIGNIFICANT CHANGE UP (ref 15–37)
BILIRUB SERPL-MCNC: 0.8 MG/DL — SIGNIFICANT CHANGE UP (ref 0.2–1.2)
BUN SERPL-MCNC: 15 MG/DL — SIGNIFICANT CHANGE UP (ref 7–23)
CALCIUM SERPL-MCNC: 8.7 MG/DL — SIGNIFICANT CHANGE UP (ref 8.5–10.1)
CHLORIDE SERPL-SCNC: 107 MMOL/L — SIGNIFICANT CHANGE UP (ref 96–108)
CO2 SERPL-SCNC: 31 MMOL/L — SIGNIFICANT CHANGE UP (ref 22–31)
CREAT SERPL-MCNC: 0.88 MG/DL — SIGNIFICANT CHANGE UP (ref 0.5–1.3)
EGFR: 107 ML/MIN/1.73M2 — SIGNIFICANT CHANGE UP
GLUCOSE SERPL-MCNC: 100 MG/DL — HIGH (ref 70–99)
HCT VFR BLD CALC: 46 % — SIGNIFICANT CHANGE UP (ref 39–50)
HGB BLD-MCNC: 16.4 G/DL — SIGNIFICANT CHANGE UP (ref 13–17)
HIV 1+2 AB+HIV1 P24 AG SERPL QL IA: SIGNIFICANT CHANGE UP
MCHC RBC-ENTMCNC: 31.5 PG — SIGNIFICANT CHANGE UP (ref 27–34)
MCHC RBC-ENTMCNC: 35.7 GM/DL — SIGNIFICANT CHANGE UP (ref 32–36)
MCV RBC AUTO: 88.3 FL — SIGNIFICANT CHANGE UP (ref 80–100)
NRBC # BLD: 0 /100 WBCS — SIGNIFICANT CHANGE UP (ref 0–0)
PLATELET # BLD AUTO: 167 K/UL — SIGNIFICANT CHANGE UP (ref 150–400)
POTASSIUM SERPL-MCNC: 3.9 MMOL/L — SIGNIFICANT CHANGE UP (ref 3.5–5.3)
POTASSIUM SERPL-SCNC: 3.9 MMOL/L — SIGNIFICANT CHANGE UP (ref 3.5–5.3)
PROT SERPL-MCNC: 7.7 G/DL — SIGNIFICANT CHANGE UP (ref 6–8.3)
RBC # BLD: 5.21 M/UL — SIGNIFICANT CHANGE UP (ref 4.2–5.8)
RBC # FLD: 11.3 % — SIGNIFICANT CHANGE UP (ref 10.3–14.5)
SODIUM SERPL-SCNC: 141 MMOL/L — SIGNIFICANT CHANGE UP (ref 135–145)
WBC # BLD: 5.57 K/UL — SIGNIFICANT CHANGE UP (ref 3.8–10.5)
WBC # FLD AUTO: 5.57 K/UL — SIGNIFICANT CHANGE UP (ref 3.8–10.5)

## 2022-09-23 PROCEDURE — 80053 COMPREHEN METABOLIC PANEL: CPT

## 2022-09-23 PROCEDURE — 36415 COLL VENOUS BLD VENIPUNCTURE: CPT

## 2022-09-23 PROCEDURE — 85027 COMPLETE CBC AUTOMATED: CPT

## 2022-09-23 PROCEDURE — 93005 ELECTROCARDIOGRAM TRACING: CPT

## 2022-09-23 PROCEDURE — 87389 HIV-1 AG W/HIV-1&-2 AB AG IA: CPT

## 2022-09-23 PROCEDURE — 93010 ELECTROCARDIOGRAM REPORT: CPT

## 2022-09-23 PROCEDURE — G0463: CPT

## 2022-09-23 RX ORDER — FAMOTIDINE 10 MG/ML
0 INJECTION INTRAVENOUS
Qty: 0 | Refills: 0 | DISCHARGE

## 2022-09-23 RX ORDER — AMOXICILLIN 250 MG/5ML
0 SUSPENSION, RECONSTITUTED, ORAL (ML) ORAL
Qty: 0 | Refills: 0 | DISCHARGE

## 2022-09-23 NOTE — H&P PST ADULT - NEGATIVE ENMT SYMPTOMS
no vertigo/no sinus symptoms/no nasal congestion/no abnormal taste sensation/no throat pain/no dysphagia

## 2022-09-23 NOTE — H&P PST ADULT - NSICDXPASTMEDICALHX_GEN_ALL_CORE_FT
PAST MEDICAL HISTORY:  Barretts esophagus     Daytime sleepiness     Deaf, right     Diverticulitis     Inderjit Guerra infection     H/O vertigo     Herniated cervical disc     History of chronic cough Barettes Esophagus    History of West Nile virus infection     Hypertension mamages with Diet/Vitamins and Walking    Lumbar herniated disc     Scoliosis     Unilateral inguinal hernia, without obstruction or gangrene, not specified as recurrent

## 2022-09-23 NOTE — H&P PST ADULT - NSANTHOSAYNRD_GEN_A_CORE
No. JADON screening performed.  STOP BANG Legend: 0-2 = LOW Risk; 3-4 = INTERMEDIATE Risk; 5-8 = HIGH Risk

## 2022-09-23 NOTE — H&P PST ADULT - ASSESSMENT
47 year old male PMH HTN, Barettes Esophagus, Headaches, Diverticulitis, Inderjit Guerra, West Nile (Dormant), Daytime Sleepiness, Vertigo, Scoliosos, Herniated Cervical and Lumbar Discs,  Shortness of Breath, Chronic Cough;  now with Unilateral Inguinal Hernia without obstruction or gangrene, not specified as recurrent; scheduled for  RIGHT Inguinal hernia Repair with Dr Charan Cowart on 10/3/22.   Pt feels he has had the hernia for at least the fast 5 years

## 2022-09-23 NOTE — H&P PST ADULT - ENMT COMMENTS
Notes "Complete deafness in RIGHT Ear and partial hearing loss in left ear" Denies use of hearing aids feels they do not help much

## 2022-09-23 NOTE — H&P PST ADULT - FALL HARM RISK - UNIVERSAL INTERVENTIONS
Bed in lowest position, wheels locked, appropriate side rails in place/Call bell, personal items and telephone in reach/Instruct patient to call for assistance before getting out of bed or chair/Non-slip footwear when patient is out of bed/[place] to call system/Physically safe environment - no spills, clutter or unnecessary equipment/Purposeful Proactive Rounding/Room/bathroom lighting operational, light cord in reach

## 2022-09-23 NOTE — H&P PST ADULT - NSICDXPASTSURGICALHX_GEN_ALL_CORE_FT
PAST SURGICAL HISTORY:  Elective surgery Sebacous Cyst removed LEFT Shoulder    H/O colonoscopy     H/O endoscopy     H/O Spinal surgery Arthur Lukas 1997

## 2022-09-23 NOTE — H&P PST ADULT - MEDICATION HERBAL REMEDIES, PROFILE
Pt is on multiple Supplements some are listed in medication reconcillation - others are Pre-Biotic, Hyaluronic Acid, Monolaurin, Asthaxanthin, Quercetin, & Bioplasma Cell Salts - all taken daily/yes

## 2022-09-23 NOTE — H&P PST ADULT - RESPIRATORY
clear to auscultation bilaterally/no respiratory distress/airway patent/breath sounds equal/good air movement

## 2022-09-23 NOTE — H&P PST ADULT - MUSCULOSKELETAL COMMENTS
Notes daily pain secondary to Scoliosis as well as herniated cervical and lumbar discs - S/P Spinal Surgery with placement of Arthur Lukas  back in 1997

## 2022-09-23 NOTE — H&P PST ADULT - PROBLEM SELECTOR PLAN 1
PST Labs; CBC,  CMP, HIV, EKG. Medical clearance scheduled with PCP on 9/26/22. Pt instructed to stop any NSAIDS and all vitamins/Herbal Supplements one week prior to procedure. may take Tylenol if needed for any pain between now and procedure. No medications needed morning of procedure. Pre-op instructions as well as pre-op wash instructions given to pt with understanding verbalized. Discussed COVID swab prior to procedure with patient. Pt will go to [facility] thru for swab. Provided pt with both address and phone numbed to [place] for him to call should he have any questions. Pt verbalizes understanding of all instructions discussed today in PST. All questions addressed with pt prior to him leaving the PST department today.

## 2022-09-23 NOTE — H&P PST ADULT - HISTORY OF PRESENT ILLNESS
47 year old male PMH HTN, Barettes Esophagus, Headaches, Diverticulitis, Inderjit Guerra, West Nile ([place]ormant), Daytime Sleepiness, Vertigo, Scoliosis, Herniated Cervical and Lumbar Discs, Shortness of Breath, Chronic Cough;  now with Unilateral Inguinal Hernia without obstruction or gangrene, not specified as recurrent; presents today for PST prior to RIGHT Inguinal hernia Repair with Dr Charan Cowart on 10/3/22.  Pt notes he has has the hernia for many years, stating that for a while it might not bother him then it can cause pain, discomfort and bulge. Pt also notes H/O Diverticulitis and goes between diarrhea and constipation.  He also notes increased urinary frequency which he feels is related to the hernia and is seeing Urologist as well. Denies any N/V related to the hernia. Following consult, exam and discussions with Dr Cowart regarding treatment options pt is electing for scheduled procedure.

## 2022-09-23 NOTE — H&P PST ADULT - NSICDXFAMILYHX_GEN_ALL_CORE_FT
FAMILY HISTORY:  Father  Still living? Yes, Estimated age: 71-80  Family history of hypertension, Age at diagnosis: Age Unknown    Mother  Still living? No  Family history of ovarian cancer, Age at diagnosis: Age Unknown  Family history of type 2 diabetes mellitus in mother, Age at diagnosis: Age Unknown

## 2022-09-26 ENCOUNTER — APPOINTMENT (OUTPATIENT)
Dept: FAMILY MEDICINE | Facility: CLINIC | Age: 47
End: 2022-09-26

## 2022-09-26 VITALS
SYSTOLIC BLOOD PRESSURE: 120 MMHG | HEART RATE: 84 BPM | WEIGHT: 174 LBS | OXYGEN SATURATION: 97 % | BODY MASS INDEX: 24.91 KG/M2 | HEIGHT: 70 IN | RESPIRATION RATE: 16 BRPM | DIASTOLIC BLOOD PRESSURE: 90 MMHG | TEMPERATURE: 98.4 F

## 2022-09-26 DIAGNOSIS — Z87.2 PERSONAL HISTORY OF DISEASES OF THE SKIN AND SUBCUTANEOUS TISSUE: ICD-10-CM

## 2022-09-26 DIAGNOSIS — Z01.818 ENCOUNTER FOR OTHER PREPROCEDURAL EXAMINATION: ICD-10-CM

## 2022-09-26 DIAGNOSIS — Z80.41 FAMILY HISTORY OF MALIGNANT NEOPLASM OF OVARY: ICD-10-CM

## 2022-09-26 DIAGNOSIS — Z87.898 PERSONAL HISTORY OF OTHER SPECIFIED CONDITIONS: ICD-10-CM

## 2022-09-26 DIAGNOSIS — Z82.49 FAMILY HISTORY OF ISCHEMIC HEART DISEASE AND OTHER DISEASES OF THE CIRCULATORY SYSTEM: ICD-10-CM

## 2022-09-26 PROCEDURE — 99213 OFFICE O/P EST LOW 20 MIN: CPT

## 2022-09-26 RX ORDER — METHYLPREDNISOLONE 4 MG/1
4 TABLET ORAL
Qty: 1 | Refills: 0 | Status: COMPLETED | COMMUNITY
Start: 2020-09-17 | End: 2022-09-26

## 2022-09-26 RX ORDER — CHOLECALCIFEROL (VITAMIN D3) 10 MCG
TABLET ORAL
Refills: 0 | Status: ACTIVE | COMMUNITY

## 2022-09-26 RX ORDER — METHYLDOPA/HYDROCHLOROTHIAZIDE 250MG-25MG
TABLET ORAL
Refills: 0 | Status: ACTIVE | COMMUNITY

## 2022-09-26 RX ORDER — CHLORHEXIDINE GLUCONATE 4 %
400 LIQUID (ML) TOPICAL
Refills: 0 | Status: ACTIVE | COMMUNITY

## 2022-09-26 RX ORDER — COLD-HOT PACK
125 MCG EACH MISCELLANEOUS
Refills: 0 | Status: ACTIVE | COMMUNITY

## 2022-09-26 RX ORDER — LECITHIN 1200 MG
1200 CAPSULE ORAL
Refills: 0 | Status: ACTIVE | COMMUNITY

## 2022-09-26 RX ORDER — UBIDECARENONE/VIT E ACET 100MG-5
100 CAPSULE ORAL
Refills: 0 | Status: ACTIVE | COMMUNITY

## 2022-09-26 RX ORDER — ASCORBIC ACID 500 MG
TABLET ORAL
Refills: 0 | Status: ACTIVE | COMMUNITY

## 2022-09-26 RX ORDER — BACILLUS COAGULANS/INULIN 1B-250 MG
CAPSULE ORAL
Refills: 0 | Status: ACTIVE | COMMUNITY

## 2022-09-26 NOTE — HISTORY OF PRESENT ILLNESS
[No Pertinent Cardiac History] : no history of aortic stenosis, atrial fibrillation, coronary artery disease, recent myocardial infarction, or implantable device/pacemaker [No Pertinent Pulmonary History] : no history of asthma, COPD, sleep apnea, or smoking [(Patient denies any chest pain, claudication, dyspnea on exertion, orthopnea, palpitations or syncope)] : Patient denies any chest pain, claudication, dyspnea on exertion, orthopnea, palpitations or syncope [Chronic Anticoagulation] : no chronic anticoagulation [Chronic Kidney Disease] : no chronic kidney disease [Diabetes] : no diabetes [FreeTextEntry1] :  R inguinal hernia repair [FreeTextEntry2] : 10/03/22 [FreeTextEntry3] : Dr. Cowart  [FreeTextEntry4] : 46 yo male PMH HTN (diet controlled), Morales's esophagus,  diverticulitis , BPH, former smoker (quit 2014) hearing loss presenting today for medical optimization for R inguinal hernia repair scheduled for 10/3/22  Dr. Cowart.  \par \par

## 2022-09-26 NOTE — PLAN
[FreeTextEntry1] : -appreciated PST labs - CBC and CMP wnl  \par -EKG NSR \par -He has stopped his vitamins already \par Patient is medically optimized for planned procedure.  \par \par -follow-up for CPE

## 2022-09-26 NOTE — ASSESSMENT
[High Risk Surgery - Intraperitoneal, Intrathoracic or Supringuinal Vascular Procedures] : High Risk Surgery - Intraperitoneal, Intrathoracic or Supringuinal Vascular Procedures - No (0) [Ischemic Heart Disease] : Ischemic Heart Disease - No (0) [Congestive Heart Failure] : Congestive Heart Failure - No (0) [Prior Cerebrovascular Accident or TIA] : Prior Cerebrovascular Accident or TIA - No (0) [Creatinine >= 2mg/dL (1 Point)] : Creatinine >= 2mg/dL - No (0) [Insulin-dependent Diabetic (1 Point)] : Insulin-dependent Diabetic - No (0) [0] : 0 , RCRI Class: I, Risk of Post-Op Cardiac Complications: 3.9%, 95% CI for Risk Estimate: 2.8% - 5.4% [Patient Optimized for Surgery] : Patient optimized for surgery [As per surgery] : as per surgery [FreeTextEntry4] : 48 yo male PMH HTN (diet controlled), Morales's esophagus,  diverticulitis , BPH, former smoker (quit 2014) hearing loss presenting today for medical optimization for R inguinal hernia repair

## 2022-09-27 PROBLEM — R40.0 SOMNOLENCE: Chronic | Status: ACTIVE | Noted: 2022-09-23

## 2022-09-27 PROBLEM — I10 ESSENTIAL (PRIMARY) HYPERTENSION: Chronic | Status: ACTIVE | Noted: 2022-09-23

## 2022-09-27 PROBLEM — K40.90 UNILATERAL INGUINAL HERNIA, WITHOUT OBSTRUCTION OR GANGRENE, NOT SPECIFIED AS RECURRENT: Chronic | Status: ACTIVE | Noted: 2022-09-23

## 2022-09-27 PROBLEM — M50.20 OTHER CERVICAL DISC DISPLACEMENT, UNSPECIFIED CERVICAL REGION: Chronic | Status: ACTIVE | Noted: 2022-09-23

## 2022-09-27 PROBLEM — Z87.09 PERSONAL HISTORY OF OTHER DISEASES OF THE RESPIRATORY SYSTEM: Chronic | Status: ACTIVE | Noted: 2022-09-23

## 2022-09-27 PROBLEM — Z87.898 PERSONAL HISTORY OF OTHER SPECIFIED CONDITIONS: Chronic | Status: ACTIVE | Noted: 2022-09-23

## 2022-09-27 PROBLEM — M51.26 OTHER INTERVERTEBRAL DISC DISPLACEMENT, LUMBAR REGION: Chronic | Status: ACTIVE | Noted: 2022-09-23

## 2022-09-27 PROBLEM — Z86.19 PERSONAL HISTORY OF OTHER INFECTIOUS AND PARASITIC DISEASES: Chronic | Status: ACTIVE | Noted: 2022-09-23

## 2022-09-29 ENCOUNTER — OUTPATIENT (OUTPATIENT)
Dept: OUTPATIENT SERVICES | Facility: HOSPITAL | Age: 47
LOS: 1 days | End: 2022-09-29
Payer: SELF-PAY

## 2022-09-29 ENCOUNTER — APPOINTMENT (OUTPATIENT)
Dept: CT IMAGING | Facility: CLINIC | Age: 47
End: 2022-09-29

## 2022-09-29 DIAGNOSIS — Z98.890 OTHER SPECIFIED POSTPROCEDURAL STATES: Chronic | ICD-10-CM

## 2022-09-29 DIAGNOSIS — T14.90XA INJURY, UNSPECIFIED, INITIAL ENCOUNTER: ICD-10-CM

## 2022-09-29 DIAGNOSIS — Z41.9 ENCOUNTER FOR PROCEDURE FOR PURPOSES OTHER THAN REMEDYING HEALTH STATE, UNSPECIFIED: Chronic | ICD-10-CM

## 2022-09-29 PROCEDURE — 51600 INJECTION FOR BLADDER X-RAY: CPT

## 2022-09-29 PROCEDURE — 72192 CT PELVIS W/O DYE: CPT

## 2022-09-29 PROCEDURE — 72192 CT PELVIS W/O DYE: CPT | Mod: 26

## 2022-09-30 ENCOUNTER — OUTPATIENT (OUTPATIENT)
Dept: OUTPATIENT SERVICES | Facility: HOSPITAL | Age: 47
LOS: 1 days | End: 2022-09-30
Payer: COMMERCIAL

## 2022-09-30 DIAGNOSIS — Z20.828 CONTACT WITH AND (SUSPECTED) EXPOSURE TO OTHER VIRAL COMMUNICABLE DISEASES: ICD-10-CM

## 2022-09-30 DIAGNOSIS — Z98.890 OTHER SPECIFIED POSTPROCEDURAL STATES: Chronic | ICD-10-CM

## 2022-09-30 DIAGNOSIS — Z41.9 ENCOUNTER FOR PROCEDURE FOR PURPOSES OTHER THAN REMEDYING HEALTH STATE, UNSPECIFIED: Chronic | ICD-10-CM

## 2022-09-30 LAB — SARS-COV-2 RNA SPEC QL NAA+PROBE: SIGNIFICANT CHANGE UP

## 2022-09-30 PROCEDURE — U0003: CPT

## 2022-09-30 PROCEDURE — U0005: CPT

## 2022-09-30 NOTE — ASU PATIENT PROFILE, ADULT - FALL HARM RISK - UNIVERSAL INTERVENTIONS
Bed in lowest position, wheels locked, appropriate side rails in place/Call bell, personal items and telephone in reach/Instruct patient to call for assistance before getting out of bed or chair/Non-slip footwear when patient is out of bed/[place] to call system/Physically safe environment - no spills, clutter or unnecessary equipment/Purposeful Proactive Rounding/Room/bathroom lighting operational, light cord in reach

## 2022-09-30 NOTE — ASU PATIENT PROFILE, ADULT - ABILITY TO HEAR (WITH HEARING AID OR HEARING APPLIANCE IF NORMALLY USED):
[place] right ear/Mildly to Moderately Impaired: difficulty hearing in some environments or speaker may need to increase volume or speak distinctly

## 2022-10-02 ENCOUNTER — TRANSCRIPTION ENCOUNTER (OUTPATIENT)
Age: 47
End: 2022-10-02

## 2022-10-03 ENCOUNTER — APPOINTMENT (OUTPATIENT)
Dept: SURGERY | Facility: HOSPITAL | Age: 47
End: 2022-10-03

## 2022-10-03 ENCOUNTER — TRANSCRIPTION ENCOUNTER (OUTPATIENT)
Age: 47
End: 2022-10-03

## 2022-10-03 ENCOUNTER — OUTPATIENT (OUTPATIENT)
Dept: OUTPATIENT SERVICES | Facility: HOSPITAL | Age: 47
LOS: 1 days | End: 2022-10-03
Payer: COMMERCIAL

## 2022-10-03 VITALS
OXYGEN SATURATION: 98 % | SYSTOLIC BLOOD PRESSURE: 126 MMHG | HEART RATE: 68 BPM | RESPIRATION RATE: 18 BRPM | DIASTOLIC BLOOD PRESSURE: 66 MMHG

## 2022-10-03 VITALS
SYSTOLIC BLOOD PRESSURE: 126 MMHG | TEMPERATURE: 98 F | RESPIRATION RATE: 15 BRPM | OXYGEN SATURATION: 100 % | HEIGHT: 70 IN | DIASTOLIC BLOOD PRESSURE: 81 MMHG | WEIGHT: 171.96 LBS | HEART RATE: 69 BPM

## 2022-10-03 DIAGNOSIS — Z41.9 ENCOUNTER FOR PROCEDURE FOR PURPOSES OTHER THAN REMEDYING HEALTH STATE, UNSPECIFIED: Chronic | ICD-10-CM

## 2022-10-03 DIAGNOSIS — Z98.890 OTHER SPECIFIED POSTPROCEDURAL STATES: Chronic | ICD-10-CM

## 2022-10-03 DIAGNOSIS — Z01.818 ENCOUNTER FOR OTHER PREPROCEDURAL EXAMINATION: ICD-10-CM

## 2022-10-03 DIAGNOSIS — K40.90 UNILATERAL INGUINAL HERNIA, WITHOUT OBSTRUCTION OR GANGRENE, NOT SPECIFIED AS RECURRENT: ICD-10-CM

## 2022-10-03 PROCEDURE — 49505 PRP I/HERN INIT REDUC >5 YR: CPT

## 2022-10-03 PROCEDURE — C1781: CPT

## 2022-10-03 PROCEDURE — 49505 PRP I/HERN INIT REDUC >5 YR: CPT | Mod: RT

## 2022-10-03 DEVICE — MESH HERNIA PERFIX PLUG LARGE 1.6 X 1.90": Type: IMPLANTABLE DEVICE | Status: FUNCTIONAL

## 2022-10-03 RX ORDER — LECITHIN 1200 MG
1 CAPSULE ORAL
Qty: 0 | Refills: 0 | DISCHARGE

## 2022-10-03 RX ORDER — SODIUM CHLORIDE 9 MG/ML
1000 INJECTION, SOLUTION INTRAVENOUS
Refills: 0 | Status: DISCONTINUED | OUTPATIENT
Start: 2022-10-03 | End: 2022-10-03

## 2022-10-03 RX ORDER — CEFAZOLIN SODIUM 1 G
2000 VIAL (EA) INJECTION ONCE
Refills: 0 | Status: COMPLETED | OUTPATIENT
Start: 2022-10-03 | End: 2022-10-03

## 2022-10-03 RX ORDER — ASCORBIC ACID 60 MG
2000 TABLET,CHEWABLE ORAL
Qty: 0 | Refills: 0 | DISCHARGE

## 2022-10-03 RX ORDER — SENNA PLUS 8.6 MG/1
1 TABLET ORAL
Qty: 12 | Refills: 0
Start: 2022-10-03 | End: 2022-10-14

## 2022-10-03 RX ORDER — OXYCODONE HYDROCHLORIDE 5 MG/1
1 TABLET ORAL
Qty: 5 | Refills: 0
Start: 2022-10-03 | End: 2022-10-04

## 2022-10-03 RX ORDER — GLUTAMIC ACID 500 MG
1000 TABLET ORAL
Qty: 0 | Refills: 0 | DISCHARGE

## 2022-10-03 RX ORDER — L.ACIDOPH/B.ANIMALIS/B.LONGUM 15B CELL
1 CAPSULE ORAL
Qty: 0 | Refills: 0 | DISCHARGE

## 2022-10-03 RX ORDER — CHOLECALCIFEROL (VITAMIN D3) 125 MCG
1 CAPSULE ORAL
Qty: 0 | Refills: 0 | DISCHARGE

## 2022-10-03 RX ORDER — PREGABALIN 225 MG/1
1 CAPSULE ORAL
Qty: 0 | Refills: 0 | DISCHARGE

## 2022-10-03 RX ORDER — TAMSULOSIN HYDROCHLORIDE 0.4 MG/1
1 CAPSULE ORAL
Qty: 0 | Refills: 0 | DISCHARGE

## 2022-10-03 RX ORDER — ONDANSETRON 8 MG/1
4 TABLET, FILM COATED ORAL ONCE
Refills: 0 | Status: DISCONTINUED | OUTPATIENT
Start: 2022-10-03 | End: 2022-10-03

## 2022-10-03 RX ORDER — HYDROMORPHONE HYDROCHLORIDE 2 MG/ML
0.5 INJECTION INTRAMUSCULAR; INTRAVENOUS; SUBCUTANEOUS
Refills: 0 | Status: DISCONTINUED | OUTPATIENT
Start: 2022-10-03 | End: 2022-10-03

## 2022-10-03 RX ORDER — ACETAMINOPHEN 500 MG
1000 TABLET ORAL ONCE
Refills: 0 | Status: DISCONTINUED | OUTPATIENT
Start: 2022-10-03 | End: 2022-10-03

## 2022-10-03 RX ORDER — VITAMIN E 100 UNIT
1 CAPSULE ORAL
Qty: 0 | Refills: 0 | DISCHARGE

## 2022-10-03 RX ADMIN — SODIUM CHLORIDE 75 MILLILITER(S): 9 INJECTION, SOLUTION INTRAVENOUS at 06:52

## 2022-10-03 RX ADMIN — SODIUM CHLORIDE 75 MILLILITER(S): 9 INJECTION, SOLUTION INTRAVENOUS at 09:35

## 2022-10-03 NOTE — BRIEF OPERATIVE NOTE - NSICDXBRIEFPROCEDURE_GEN_ALL_CORE_FT
PROCEDURES:  Repair of recurrent reducible inguinal hernia using mesh 03-Oct-2022 08:57:41 right inguinal hernia Lia Syed

## 2022-10-03 NOTE — ASU DISCHARGE PLAN (ADULT/PEDIATRIC) - NS MD DC FALL RISK RISK
For information on Fall & Injury Prevention, visit: [email or web address] OR  [email or web address] OR  https://www.cdc.gov/steadi/patient.html

## 2022-10-03 NOTE — ASU DISCHARGE PLAN (ADULT/PEDIATRIC) - CARE PROVIDER_API CALL
Charan Cowart)  Surgery  [address] [place] [address]  Phone: [phone]  Fax: [phone]  Follow Up Time: 1 week

## 2022-10-03 NOTE — ASU DISCHARGE PLAN (ADULT/PEDIATRIC) - ASU DC SPECIAL INSTRUCTIONSFT
diet as tolerated  activity as tolerated - no heavy lifting for at least 6 weeks  alternate tylenol 1000 every 6 hours and motrin 400mg every 6 hours   oxycodone 5mg q6h prn for severe pain  take senna at night as rx'ed for constipation    call for appointment in 1 week

## 2022-10-10 RX ORDER — OXYCODONE HYDROCHLORIDE 5 MG/1
1 TABLET ORAL
Qty: 6 | Refills: 0
Start: 2022-10-10

## 2022-10-18 ENCOUNTER — APPOINTMENT (OUTPATIENT)
Dept: SURGERY | Facility: CLINIC | Age: 47
End: 2022-10-18

## 2022-10-18 VITALS
OXYGEN SATURATION: 97 % | HEIGHT: 70 IN | HEART RATE: 79 BPM | DIASTOLIC BLOOD PRESSURE: 88 MMHG | BODY MASS INDEX: 25.48 KG/M2 | WEIGHT: 178 LBS | SYSTOLIC BLOOD PRESSURE: 141 MMHG

## 2022-10-18 PROCEDURE — 99024 POSTOP FOLLOW-UP VISIT: CPT

## 2022-10-31 ENCOUNTER — APPOINTMENT (OUTPATIENT)
Dept: FAMILY MEDICINE | Facility: CLINIC | Age: 47
End: 2022-10-31

## 2022-10-31 VITALS
TEMPERATURE: 97.8 F | RESPIRATION RATE: 14 BRPM | BODY MASS INDEX: 24.62 KG/M2 | OXYGEN SATURATION: 99 % | SYSTOLIC BLOOD PRESSURE: 118 MMHG | DIASTOLIC BLOOD PRESSURE: 80 MMHG | WEIGHT: 172 LBS | HEART RATE: 81 BPM | HEIGHT: 70 IN

## 2022-10-31 DIAGNOSIS — Z87.19 OTHER SPECIFIED POSTPROCEDURAL STATES: ICD-10-CM

## 2022-10-31 DIAGNOSIS — Z98.890 OTHER SPECIFIED POSTPROCEDURAL STATES: ICD-10-CM

## 2022-10-31 DIAGNOSIS — Z13.29 ENCOUNTER FOR SCREENING FOR OTHER SUSPECTED ENDOCRINE DISORDER: ICD-10-CM

## 2022-10-31 DIAGNOSIS — Z23 ENCOUNTER FOR IMMUNIZATION: ICD-10-CM

## 2022-10-31 DIAGNOSIS — Z13.220 ENCOUNTER FOR SCREENING FOR LIPOID DISORDERS: ICD-10-CM

## 2022-10-31 DIAGNOSIS — Z12.5 ENCOUNTER FOR SCREENING FOR MALIGNANT NEOPLASM OF PROSTATE: ICD-10-CM

## 2022-10-31 DIAGNOSIS — Z13.1 ENCOUNTER FOR SCREENING FOR DIABETES MELLITUS: ICD-10-CM

## 2022-10-31 PROCEDURE — 90715 TDAP VACCINE 7 YRS/> IM: CPT

## 2022-10-31 PROCEDURE — 90471 IMMUNIZATION ADMIN: CPT

## 2022-10-31 PROCEDURE — 99396 PREV VISIT EST AGE 40-64: CPT | Mod: 25

## 2022-10-31 NOTE — PLAN
[FreeTextEntry1] : #HCM \par -lab work script provided (CBC and CMP done in Sept was wnl) \par -screening colonoscopy -has upcoming apt with GI \par -depression screen negative \par -vaccinations: deferred influenza vaccine today, TDAP given during today's encounter \par -he will follow-up w/ his ENT for history of tinnitus/hearing loss \par -derm follow-up for skin check  \par -follow-up yearly for CPE and PRN

## 2022-10-31 NOTE — HISTORY OF PRESENT ILLNESS
[FreeTextEntry1] : CPE  [de-identified] : 48 yo male PMH HTN (diet controlled), Morales's esophagus, diverticulitis , BPH, former smoker (quit 2014) hearing loss presenting today for CPE. \par In terms of specialists follows w/ surgery (Dr. Cowart), GI (Dr. Yoon), Urology (Dr. Christensen).  \par Denies any complaints today.  \par

## 2022-10-31 NOTE — HEALTH RISK ASSESSMENT
[Former] : Former [10-14] : 10-14 [Yes] : Yes [2 - 4 times a month (2 pts)] : 2-4 times a month (2 points) [1 or 2 (0 pts)] : 1 or 2 (0 points) [Never (0 pts)] : Never (0 points) [No] : In the past 12 months have you used drugs other than those required for medical reasons? No [No falls in past year] : Patient reported no falls in the past year [0] : 2) Feeling down, depressed, or hopeless: Not at all (0) [PHQ-2 Negative - No further assessment needed] : PHQ-2 Negative - No further assessment needed [HIV Test offered] : HIV Test offered [Hepatitis C test offered] : Hepatitis C test offered [With Family] : lives with family [Fully functional (bathing, dressing, toileting, transferring, walking, feeding)] : Fully functional (bathing, dressing, toileting, transferring, walking, feeding) [Fully functional (using the telephone, shopping, preparing meals, housekeeping, doing laundry, using] : Fully functional and needs no help or supervision to perform IADLs (using the telephone, shopping, preparing meals, housekeeping, doing laundry, using transportation, managing medications and managing finances) [Single] : single [YearQuit] : 2014 [Audit-CScore] : 2 [[id number]] : 0 [ColonoscopyComments] : has f/u with GI next week   [de-identified] : two sisters and father  [de-identified] : h [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

## 2022-10-31 NOTE — ASSESSMENT
[FreeTextEntry1] : 48 yo male PMH HTN (diet controlled), Morales's esophagus, diverticulitis , BPH, former smoker (quit 2014) hearing loss presenting today for CPE.

## 2022-11-02 ENCOUNTER — APPOINTMENT (OUTPATIENT)
Dept: UROLOGY | Facility: CLINIC | Age: 47
End: 2022-11-02

## 2022-11-02 VITALS
WEIGHT: 172 LBS | DIASTOLIC BLOOD PRESSURE: 70 MMHG | SYSTOLIC BLOOD PRESSURE: 128 MMHG | HEIGHT: 70 IN | BODY MASS INDEX: 24.62 KG/M2

## 2022-11-02 DIAGNOSIS — N40.1 BENIGN PROSTATIC HYPERPLASIA WITH LOWER URINARY TRACT SYMPMS: ICD-10-CM

## 2022-11-02 DIAGNOSIS — N13.8 BENIGN PROSTATIC HYPERPLASIA WITH LOWER URINARY TRACT SYMPMS: ICD-10-CM

## 2022-11-02 PROCEDURE — 99213 OFFICE O/P EST LOW 20 MIN: CPT | Mod: 25

## 2022-11-02 PROCEDURE — 51798 US URINE CAPACITY MEASURE: CPT

## 2022-11-02 NOTE — HISTORY OF PRESENT ILLNESS
[FreeTextEntry1] : Mr. RIVERA is a 47 year White M who comes today to clinic referred for frequency in urination. denies dysuria, fevers, chills. History of prostatitis about 3 years ago requiring antibiotics for resolution. Also pmh of scoliosis s/p spinal surgery 1998 with mild motor and sensorial deficit immediately after surgery which recovered with PT/OT. No bladder symptoms or urination issues after spinal surgery. Presents with frequency and slight urgency every 1-2h. Sensation of incomplete emptying, nocturia x1, weak stream.\par No family history of prostate cancer.\par Has inguinal hernia repair schedule in about 2 weeks.\par History of trauma with gas compressor into the urethra that caused the hernia and d originated the frequency. \par \par 11/2/22\par s/p hernia repair\par self-discontinued tamsulosin due to side effects\par overall symptoms much improved, not bothered at all\par PVR: 0cc\par PSA 0.6\par

## 2022-11-02 NOTE — PHYSICAL EXAM
[General Appearance - Well Developed] : well developed [General Appearance - Well Nourished] : well nourished [Normal Appearance] : normal appearance [Well Groomed] : well groomed [General Appearance - In No Acute Distress] : no acute distress [Abdomen Soft] : soft [Abdomen Tenderness] : non-tender [Costovertebral Angle Tenderness] : no ~M costovertebral angle tenderness [Urethral Meatus] : meatus normal [Urinary Bladder Findings] : the bladder was normal on palpation [Scrotum] : the scrotum was normal [Testes Mass (___cm)] : there were no testicular masses [Edema] : no peripheral edema [] : no respiratory distress [Respiration, Rhythm And Depth] : normal respiratory rhythm and effort [Exaggerated Use Of Accessory Muscles For Inspiration] : no accessory muscle use [Oriented To Time, Place, And Person] : oriented to person, place, and time [Affect] : the affect was normal [Mood] : the mood was normal [Not Anxious] : not anxious [Normal Station and Gait] : the gait and station were normal for the patient's age [No Focal Deficits] : no focal deficits [No Palpable Adenopathy] : no palpable adenopathy [FreeTextEntry1] : inguinal incision CDI

## 2022-11-02 NOTE — ASSESSMENT
[FreeTextEntry1] : Mr. RIVERA is a 47 year White M with LUTS, most likely BPH. Given history, there is a possibility for a neurogenic component. However patient discontinued tamsulosin and he is doing much better after hernia repair. \par \par Today we discussed that BPH is a condition of prostatic enlargement that blocks the bladder outlet and can make it difficult to void. Over time this can manifest itself as urinary frequency, urgency, straining to void, poor stream, nocturia, high PVR's and retention. While BPH is related to prostate volume (increasing prostate volume results in increased likelihood of complications from BPH), there are many men with "normal volume" prostates that have symptoms of occlusion.\par \par We discussed the effects that BPH can cause on the bladder: BPH can cause detrusor overactivity which results in urgency and frequency and in some cases, incontinence. Over a longer time,some men may develop large volume/acontractile bladders, while other men develop small or normal volume bladders with loss of compliance. Neither is ideal and both represent end stage bladder damage that can not be fixed and can cause kidney injury. I explained the 3 main jobs of the bladder which are to 1) store urine, 2) evacuate urine and 3) keep urine at low pressure to prevent upper tract injury. I explained the mechanisms of urinary tract infections, hydronephrosis and kidney injury due to urinary retention. These consequences can be severe, irreversible and even life threatening.\par \par We then discussed the treatment options for BPH. We generally start with medical management and proceed to surgery if necessary. Alpha blockers act to relax the prostate while 5-alpha reductase inhibitors (5-AG’s) shrink the prostate. 5-AG's are most effective in patients who have prostates that are >40gm. The former are fairly fast acting (in a matter of days-weeks) while the latter can take up to 3-6 months to take effect.\par \par Anticholinergics and Myrbetriq merely treat the bladder symptoms that are caused by the sequelae of outlet obstruction. They "relax" the bladder and they can weaken the urine stream and make retention worse or precipitate retention. They do not address the underlying cause of overactivity in this setting but do ease bladder spasms and can help with urgency and frequency. They are useful in certain carefully selected patients.\par \par If medical management fails, the patient can not wait for medical management to work, or the patient has CASPER caused by retention, he may consider surgery. There are different surgical options including transurethral resection of the prostate, Greenlight laser prostatectomy, Minimally invasive surgical therapies like Urolift and Rezum, and simple prostatectomy. We discussed the risks including bleeding, infection, damage to the urethra, bladder and ureteral orifices, scar tissue, leaking (either due to injuring the sphincter or lowering bladder outlet obstruction with concomitant detrusor overactivity) and retrograde ejaculation. The patient understands that some of these complications are reversible while some are not and may require additional procedures.\par \par Based on our discussion the patient wishes to observe symptoms as he was not able to tolerate alpha blockers due to retrograde ejaculation and does not wish to pursue a procedure at this point.\par \par Will follow up in to years for PSA or earlier PRN.

## 2023-05-25 ENCOUNTER — NON-APPOINTMENT (OUTPATIENT)
Age: 48
End: 2023-05-25

## 2023-06-12 ENCOUNTER — NON-APPOINTMENT (OUTPATIENT)
Age: 48
End: 2023-06-12

## 2023-06-12 ENCOUNTER — APPOINTMENT (OUTPATIENT)
Dept: FAMILY MEDICINE | Facility: CLINIC | Age: 48
End: 2023-06-12
Payer: COMMERCIAL

## 2023-06-12 VITALS
WEIGHT: 186 LBS | DIASTOLIC BLOOD PRESSURE: 85 MMHG | TEMPERATURE: 98.6 F | RESPIRATION RATE: 18 BRPM | HEIGHT: 70 IN | SYSTOLIC BLOOD PRESSURE: 147 MMHG | OXYGEN SATURATION: 97 % | BODY MASS INDEX: 26.63 KG/M2 | HEART RATE: 109 BPM

## 2023-06-12 DIAGNOSIS — Z13.21 ENCOUNTER FOR SCREENING FOR NUTRITIONAL DISORDER: ICD-10-CM

## 2023-06-12 DIAGNOSIS — M25.519 PAIN IN UNSPECIFIED SHOULDER: ICD-10-CM

## 2023-06-12 PROCEDURE — 99215 OFFICE O/P EST HI 40 MIN: CPT

## 2023-06-12 RX ORDER — OXYCODONE 5 MG/1
5 TABLET ORAL
Qty: 6 | Refills: 0 | Status: COMPLETED | COMMUNITY
Start: 2022-10-10 | End: 2023-06-12

## 2023-06-12 RX ORDER — TAMSULOSIN HYDROCHLORIDE 0.4 MG/1
0.4 CAPSULE ORAL
Qty: 30 | Refills: 5 | Status: COMPLETED | COMMUNITY
Start: 2022-09-14 | End: 2023-06-12

## 2023-06-12 NOTE — PLAN
[FreeTextEntry1] : -follow-up lab work \par -headache, f/u CT and f/u with neuro, discussed ER precautions\par -for vision changes, also follow-up with optho\par -for tinnitus f/u with ENT/neuro\par -for sensation of lump in throat - he will f/u with GI given history of Morales's and ENT \par -f/u Xray and with sports medicine for shoulder pain \par -follow-up in office in 2 months or sooner\par -paper work for FMLA filled out

## 2023-06-12 NOTE — PHYSICAL EXAM
[Coordination Grossly Intact] : coordination grossly intact [No Focal Deficits] : no focal deficits [Normal Gait] : normal gait [Normal] : affect was normal and insight and judgment were intact [de-identified] : R shoulder ROM  overall wnl

## 2023-06-12 NOTE — HISTORY OF PRESENT ILLNESS
[FreeTextEntry1] : follow-up   [de-identified] : 49 yo male PMH HTN (diet controlled), Morales's esophagus, diverticulitis , BPH, former smoker (quit 2014) hearing loss presenting today for follow-up. \par He tested positive for COVID - first week of May, has tested negative since then. Initially he had cough, nasal congestion, muscle aches, headaches, tinnitus (had prior, became worse). Since he still has lingering symptoms which include: headache, trouble sleeping, tinnitus. \par In terms of tinnitus (L ear) - he has seen an ENT in the past - was given a hearing aid - does not use.   \par In terms of headaches, has been having daily, frontal/orbital region, can be throbbing. Sometimes he feels blurry vision/lightheadedness with headaches. Not positional. \par He also states that when he swallows he feels as if there is a lump in his throat. Reports he has had a similar symptom in the past. He has know Morales's esophagus.  \par He also mentions an episode of R shoulder radiating pain (numbness/tingling) when he was opening a jar.

## 2023-06-12 NOTE — HISTORY OF PRESENT ILLNESS
[FreeTextEntry1] : follow-up   [de-identified] : 49 yo male PMH HTN (diet controlled), Morales's esophagus, diverticulitis , BPH, former smoker (quit 2014) hearing loss presenting today for follow-up. \par He tested positive for COVID - first week of May, has tested negative since then. Initially he had cough, nasal congestion, muscle aches, headaches, tinnitus (had prior, became worse). Since he still has lingering symptoms which include: headache, trouble sleeping, tinnitus. \par In terms of tinnitus (L ear) - he has seen an ENT in the past - was given a hearing aid - does not use.   \par In terms of headaches, has been having daily, frontal/orbital region, can be throbbing. Sometimes he feels blurry vision/lightheadedness with headaches. Not positional. \par He also states that when he swallows he feels as if there is a lump in his throat. Reports he has had a similar symptom in the past. He has know Morales's esophagus.  \par He also mentions an episode of R shoulder radiating pain (numbness/tingling) when he was opening a jar.

## 2023-06-12 NOTE — PHYSICAL EXAM
[Coordination Grossly Intact] : coordination grossly intact [No Focal Deficits] : no focal deficits [Normal Gait] : normal gait [Normal] : affect was normal and insight and judgment were intact [de-identified] : R shoulder ROM  overall wnl

## 2023-06-12 NOTE — HISTORY OF PRESENT ILLNESS
[FreeTextEntry1] : follow-up   [de-identified] : 49 yo male PMH HTN (diet controlled), Morales's esophagus, diverticulitis , BPH, former smoker (quit 2014) hearing loss presenting today for follow-up. \par He tested positive for COVID - first week of May, has tested negative since then. Initially he had cough, nasal congestion, muscle aches, headaches, tinnitus (had prior, became worse). Since he still has lingering symptoms which include: headache, trouble sleeping, tinnitus. \par In terms of tinnitus (L ear) - he has seen an ENT in the past - was given a hearing aid - does not use.   \par In terms of headaches, has been having daily, frontal/orbital region, can be throbbing. Sometimes he feels blurry vision/lightheadedness with headaches. Not positional. \par He also states that when he swallows he feels as if there is a lump in his throat. Reports he has had a similar symptom in the past. He has know Morales's esophagus.  \par He also mentions an episode of R shoulder radiating pain (numbness/tingling) when he was opening a jar.

## 2023-06-12 NOTE — HISTORY OF PRESENT ILLNESS
[FreeTextEntry1] : follow-up   [de-identified] : 47 yo male PMH HTN (diet controlled), Morales's esophagus, diverticulitis , BPH, former smoker (quit 2014) hearing loss presenting today for follow-up. \par He tested positive for COVID - first week of May, has tested negative since then. Initially he had cough, nasal congestion, muscle aches, headaches, tinnitus (had prior, became worse). Since he still has lingering symptoms which include: headache, trouble sleeping, tinnitus. \par In terms of tinnitus (L ear) - he has seen an ENT in the past - was given a hearing aid - does not use.   \par In terms of headaches, has been having daily, frontal/orbital region, can be throbbing. Sometimes he feels blurry vision/lightheadedness with headaches. Not positional. \par He also states that when he swallows he feels as if there is a lump in his throat. Reports he has had a similar symptom in the past. He has know Morales's esophagus.  \par He also mentions an episode of R shoulder radiating pain (numbness/tingling) when he was opening a jar.

## 2023-06-12 NOTE — PHYSICAL EXAM
[Coordination Grossly Intact] : coordination grossly intact [No Focal Deficits] : no focal deficits [Normal Gait] : normal gait [Normal] : affect was normal and insight and judgment were intact [de-identified] : R shoulder ROM  overall wnl

## 2023-06-12 NOTE — ASSESSMENT
[FreeTextEntry1] : 47 yo male PMH HTN (diet controlled), Morales's esophagus, diverticulitis , BPH, former smoker (quit 2014) hearing loss presenting today for follow-up.

## 2023-06-12 NOTE — PHYSICAL EXAM
[Coordination Grossly Intact] : coordination grossly intact [No Focal Deficits] : no focal deficits [Normal Gait] : normal gait [Normal] : affect was normal and insight and judgment were intact [de-identified] : R shoulder ROM  overall wnl

## 2023-06-12 NOTE — ASSESSMENT
[FreeTextEntry1] : 49 yo male PMH HTN (diet controlled), Morales's esophagus, diverticulitis , BPH, former smoker (quit 2014) hearing loss presenting today for follow-up.

## 2023-06-14 LAB
25(OH)D3 SERPL-MCNC: 39.7 NG/ML
ALBUMIN SERPL ELPH-MCNC: 4.4 G/DL
ALP BLD-CCNC: 64 U/L
ALT SERPL-CCNC: 32 U/L
ANION GAP SERPL CALC-SCNC: 13 MMOL/L
AST SERPL-CCNC: 26 U/L
BILIRUB SERPL-MCNC: 1 MG/DL
BUN SERPL-MCNC: 10 MG/DL
CALCIUM SERPL-MCNC: 9.5 MG/DL
CHLORIDE SERPL-SCNC: 102 MMOL/L
CHOLEST SERPL-MCNC: 193 MG/DL
CO2 SERPL-SCNC: 26 MMOL/L
CREAT SERPL-MCNC: 0.93 MG/DL
EGFR: 102 ML/MIN/1.73M2
ESTIMATED AVERAGE GLUCOSE: 105 MG/DL
GLUCOSE SERPL-MCNC: 82 MG/DL
HBA1C MFR BLD HPLC: 5.3 %
HCV AB SER QL: NONREACTIVE
HCV S/CO RATIO: 0.18 S/CO
HDLC SERPL-MCNC: 46 MG/DL
HIV1+2 AB SPEC QL IA.RAPID: NONREACTIVE
LDLC SERPL CALC-MCNC: 124 MG/DL
NONHDLC SERPL-MCNC: 147 MG/DL
POTASSIUM SERPL-SCNC: 4.2 MMOL/L
PROT SERPL-MCNC: 6.9 G/DL
PSA FREE FLD-MCNC: 27 %
PSA FREE SERPL-MCNC: 0.26 NG/ML
PSA SERPL-MCNC: 0.93 NG/ML
SODIUM SERPL-SCNC: 141 MMOL/L
TRIGL SERPL-MCNC: 112 MG/DL
TSH SERPL-ACNC: 1.76 UIU/ML

## 2023-06-19 NOTE — ASU PREOP CHECKLIST - SKIN PREP
[facility]  Cardiac Electrophysiology  [address], [address]  [place] [address]  Phone: [phone]  Fax: [phone]  Follow Up Time:    Demar Amato  Cardiac Electrophysiology  [address], [address]  [place] [address]  Phone: [phone]  Fax: [phone]  Follow Up Time:     Karena Peters  Transplant Hepatology  [address], [address]  [place] [address]  Phone: [phone]  Fax: [phone]  Follow Up Time:     Juan Napier Jr.  Thoracic and Cardiac Surgery  [address] [place] [id number]  Phone: [phone]  Fax: [phone]  Follow Up Time:    done

## 2023-06-20 ENCOUNTER — NON-APPOINTMENT (OUTPATIENT)
Age: 48
End: 2023-06-20

## 2023-06-21 ENCOUNTER — NON-APPOINTMENT (OUTPATIENT)
Age: 48
End: 2023-06-21

## 2023-06-23 ENCOUNTER — APPOINTMENT (OUTPATIENT)
Dept: ORTHOPEDIC SURGERY | Facility: CLINIC | Age: 48
End: 2023-06-23
Payer: COMMERCIAL

## 2023-06-23 ENCOUNTER — NON-APPOINTMENT (OUTPATIENT)
Age: 48
End: 2023-06-23

## 2023-06-23 ENCOUNTER — TRANSCRIPTION ENCOUNTER (OUTPATIENT)
Age: 48
End: 2023-06-23

## 2023-06-23 VITALS
WEIGHT: 185 LBS | HEIGHT: 70 IN | BODY MASS INDEX: 26.48 KG/M2 | HEART RATE: 82 BPM | SYSTOLIC BLOOD PRESSURE: 143 MMHG | DIASTOLIC BLOOD PRESSURE: 80 MMHG

## 2023-06-23 DIAGNOSIS — M25.511 PAIN IN RIGHT SHOULDER: ICD-10-CM

## 2023-06-23 DIAGNOSIS — M40.30 FLATBACK SYNDROME, SITE UNSPECIFIED: ICD-10-CM

## 2023-06-23 PROCEDURE — 72110 X-RAY EXAM L-2 SPINE 4/>VWS: CPT

## 2023-06-23 PROCEDURE — 72050 X-RAY EXAM NECK SPINE 4/5VWS: CPT

## 2023-06-23 PROCEDURE — 99203 OFFICE O/P NEW LOW 30 MIN: CPT

## 2023-06-23 NOTE — DISCUSSION/SUMMARY
[de-identified] : Back, neck, shoulder pain.  Discussed with him that because of his surgery he does have a flat back and he can put more stress on the spine and muscles in the adjacent areas to his fusion.  Because of this, he can get muscle fatigue.  I recommend a course of physical therapy to help with stretching and strengthening of the low back, core, hip, neck, shoulder that can help with this overall achy type of pain.  Discussed that he should continue these exercises long-term to help prevent these issues from flaring up.  The patient expressed understanding of the diagnosis and treatment plan and all questions were answered.\par \par This note was generated using dragon medical dictation software.  A reasonable effort has been made for proofreading its contents, but typos may still remain.  If there are any questions or points of clarification needed please notify my office.

## 2023-06-23 NOTE — HISTORY OF PRESENT ILLNESS
[de-identified] : NEELA RIVERA  is a 48 year year old M who presents with back and neck pain as well as right shoulder pain.  He has a history of a spinal fusion for scoliosis in 1997.  He says that over the past few years he has noticed worsening tension type pain in his low right side of his back.  He has had a few episodes of sharp pain with certain activities, but denies any significant new trauma.  He says that the pain can radiate distally and up the back as well.  He also notices some neck pain when he looks upward.  He is also noticed some right shoulder pain after he fell onto a curb about 6 years ago.  Overall, he says that he feels tension in the back which has been worsening.  He says it can be as bad as a 5 out of 10.  He has been to a chiropractor with some relief of the pain.  He denies any radiation of the pain into his legs or any numbness or tingling in his toes.

## 2023-06-23 NOTE — PHYSICAL EXAM
[de-identified] : General: No apparent distress\par Cardiovascular: extremities warm and well-perfused, no cyanosis\par Pulmonary: non labored respirations\par \par Musculoskeletal:\par \par Cervical Spine:\par No Tenderness to palpation over the cervical spine in the midline or at the paraspinal musculature\par Full range of motion without pain\par Negative Spurling test\par Negative L'Hermitte test\par Motor: 5/5 deltoid/biceps/triceps/wrist extensors/wrist flexors/interossei\par Sensory: SILT in C5/C6/C7/C8/T1 distributions\par \par Right shoulder:\par Skin: intact, no erythema or warmth, no open wounds\par Active ROM and passive range of motion: 170 forward flexion/90 external rotation/internal rotation to the thoracic spine\par Motor and sensory intact, fingers warm and well-perfused\par Tenderness: None\par RC strength: 5/5\par Musculoskeletal:\par \par Lumbar Spine:\par Flexion/Extension: Limited flexion and extension\par Straight Leg Raise: Negative\par LE Motor Exam: 5/5 hip flexors/quad/EHL/TA/GS, 4 out of 5 EHL on the left, which is chronic from his back surgery\par Sensory: SILT throughout all distributions\par \par No pain with motion of the right or left hip [de-identified] : 4 views of the right shoulder obtained today and interpreted by me including Grashey AP, external rotation AP, axillary lateral, zanca view demonstrate no acute fracture or dislocation.  Thoracic hardware is incompletely visualized\par \par 4 views of the lumbar spine obtained today and interpreted by me including AP, lateral, flexion and extension views demonstrate no acute fracture or dislocation.  There is evidence of prior lumbar fusion with hardware that appears intact with no apparent complication\par \par 4 views of the cervical spine obtained today and interpreted by me including AP, lateral, flexion and extension views demonstrate no acute fracture or dislocation.  There are minimal degenerative disks changes about the cervical spine

## 2023-07-27 ENCOUNTER — APPOINTMENT (OUTPATIENT)
Dept: OTOLARYNGOLOGY | Facility: CLINIC | Age: 48
End: 2023-07-27
Payer: COMMERCIAL

## 2023-07-27 VITALS
DIASTOLIC BLOOD PRESSURE: 80 MMHG | OXYGEN SATURATION: 98 % | SYSTOLIC BLOOD PRESSURE: 135 MMHG | BODY MASS INDEX: 26.48 KG/M2 | HEART RATE: 90 BPM | HEIGHT: 70 IN | WEIGHT: 185 LBS | TEMPERATURE: 96.6 F

## 2023-07-27 PROCEDURE — 99203 OFFICE O/P NEW LOW 30 MIN: CPT

## 2023-07-27 NOTE — REVIEW OF SYSTEMS
[As Noted in HPI] : as noted in HPI [Hearing Loss] : hearing loss [Dizziness] : dizziness [Ear Noises] : ear noises [Lightheadedness] : lightheadedness [Negative] : Heme/Lymph

## 2023-07-28 ENCOUNTER — APPOINTMENT (OUTPATIENT)
Dept: ORTHOPEDIC SURGERY | Facility: CLINIC | Age: 48
End: 2023-07-28
Payer: COMMERCIAL

## 2023-07-28 VITALS — DIASTOLIC BLOOD PRESSURE: 93 MMHG | HEART RATE: 90 BPM | SYSTOLIC BLOOD PRESSURE: 149 MMHG

## 2023-07-28 PROCEDURE — 99213 OFFICE O/P EST LOW 20 MIN: CPT

## 2023-07-28 NOTE — PHYSICAL EXAM
[de-identified] : General: No apparent distress\par Cardiovascular: extremities warm and well-perfused, no cyanosis\par Pulmonary: non labored respirations\par \par Musculoskeletal:\par \par \par Right shoulder:\par Skin: intact, no erythema or warmth, no open wounds\par Active ROM and passive range of motion: 170 forward flexion/90 external rotation/internal rotation to the thoracic spine\par Motor and sensory intact, fingers warm and well-perfused\par Tenderness: None\par RC strength: 5/5\par Musculoskeletal:\par \par Lumbar Spine:\par Flexion/Extension: Limited flexion and extension\par Straight Leg Raise: Negative\par LE Motor Exam: 5/5 hip flexors/quad/EHL/TA/GS, 4 out of 5 EHL on the left, which is chronic from his back surgery\par Sensory: SILT throughout all distributions\par \par No pain with motion of the right or left hip

## 2023-07-28 NOTE — DISCUSSION/SUMMARY
[de-identified] : Back, neck, shoulder pain.  Discussed with him that he has multiple issues including multiple nonorthopedic issues as well.  He would like to go on long-term disability, but I informed him that it would be inappropriate for me to write a letter allowing this as I have only seen him once and it would be more appropriate for his original surgeon or his primary doctor to discuss this with him.  From the perspective of his back, neck, shoulder, he should continue with physical therapy to continue strengthening and stretching those areas.  He may come back to see me on an as-needed basis.  The patient expressed understanding of the diagnosis and treatment plan and all questions were answered.\par \par This note was generated using dragon medical dictation software.  A reasonable effort has been made for proofreading its contents, but typos may still remain.  If there are any questions or points of clarification needed please notify my office.

## 2023-07-28 NOTE — HISTORY OF PRESENT ILLNESS
[de-identified] : NEELA RIVERA  is a 48 year year old M who presents for a follow-up for back and neck pain as well as right shoulder pain. Patient explains that pain has stayed the same.  Patient explains that heavy lifting and movement that occurs during work has been increasing pain. He has been struggling with additional issues including insomnia since having COVID, tinnitus is worse in B ears. Patient states that full body aches and pains keeps him up at night and cannot get comfortable. His previous surgeon is ERASMO and has seen him once recently. Patient states that pain is 6/10 and is currently on anti-inflammatory. He states he had L hernia surgery on Oct.

## 2023-08-04 NOTE — ASSESSMENT
[FreeTextEntry1] : 48 year old male with tinnitus and hearing loss - we have referred him for a hearing test. We also discussed masking techniques for tinnitus.

## 2023-08-04 NOTE — HISTORY OF PRESENT ILLNESS
[Tinnitus] : tinnitus [Headache] : headache [Hearing Loss] : hearing loss [Lightheadedness] : lightheadedness [de-identified] : Mr. RIVERA is a 48 year male who presents with hearing loss.  He reports that he has decreased hearing in his right ear since he was 3.  He reports having tinnitus in his left ear has worsen.  He reports a louder, buzzing noise in his right ear - different tone from the left.  No recent hearing test since he was last seen in ENT by Dr. Navarro in 2013.  He also reports worsening headache and eye issues since he had Covid in May, 2023.

## 2023-08-10 ENCOUNTER — APPOINTMENT (OUTPATIENT)
Dept: NEUROLOGY | Facility: CLINIC | Age: 48
End: 2023-08-10
Payer: COMMERCIAL

## 2023-08-10 VITALS
TEMPERATURE: 98.5 F | WEIGHT: 185 LBS | BODY MASS INDEX: 26.48 KG/M2 | SYSTOLIC BLOOD PRESSURE: 150 MMHG | OXYGEN SATURATION: 97 % | DIASTOLIC BLOOD PRESSURE: 104 MMHG | HEART RATE: 98 BPM | HEIGHT: 70 IN

## 2023-08-10 VITALS
SYSTOLIC BLOOD PRESSURE: 150 MMHG | DIASTOLIC BLOOD PRESSURE: 104 MMHG | WEIGHT: 185 LBS | HEART RATE: 98 BPM | BODY MASS INDEX: 26.48 KG/M2 | HEIGHT: 70 IN

## 2023-08-10 DIAGNOSIS — G89.29 PAIN, UNSPECIFIED: ICD-10-CM

## 2023-08-10 DIAGNOSIS — R51.9 HEADACHE, UNSPECIFIED: ICD-10-CM

## 2023-08-10 DIAGNOSIS — R52 PAIN, UNSPECIFIED: ICD-10-CM

## 2023-08-10 DIAGNOSIS — H53.9 UNSPECIFIED VISUAL DISTURBANCE: ICD-10-CM

## 2023-08-10 PROCEDURE — 99205 OFFICE O/P NEW HI 60 MIN: CPT

## 2023-08-10 RX ORDER — ZINC SULFATE 50(220)MG
CAPSULE ORAL
Refills: 0 | Status: ACTIVE | COMMUNITY

## 2023-08-10 RX ORDER — GABAPENTIN 100 MG/1
100 CAPSULE ORAL
Qty: 60 | Refills: 6 | Status: ACTIVE | COMMUNITY
Start: 2023-08-10 | End: 1900-01-01

## 2023-08-10 NOTE — ASSESSMENT
[FreeTextEntry1] : Impression: This 48-year-old male patient status post extensive spinal surgery with fusion in 1997 with chronic pain status post right ear deafness and chronic tinnitus is status post an episode of COVID virus in May 2023.  Since that episode there is exacerbation of his chronic pain especially right cervical and to right shoulder and he is now having headache cognitive symptomatology binocular blurring of vision dizziness and insomnia and he has been depressed.  His presentation is consistent with a post COVID or long COVID syndrome.  Recommendations: Begin gabapentin 100 mg 2 capsules at bedtime to start.  Agree with meloxicam 15 mg once daily.  Physical therapy.  Orthopedic spine follow-up and consider repeating spinal MRIs.  Office follow-up.  This patient would be considered disabled from the neurological standpoint and unable to work in any capacity at this time.

## 2023-08-10 NOTE — CONSULT LETTER
[Dear  ___] : Dear  [unfilled], [Consult Letter:] : I had the pleasure of evaluating your patient, [unfilled]. [Please see my note below.] : Please see my note below. [Consult Closing:] : Thank you very much for allowing me to participate in the care of this patient.  If you have any questions, please do not hesitate to contact me. [Sincerely,] : Sincerely, [FreeTextEntry3] : Amor Slater MD

## 2023-08-10 NOTE — HISTORY OF PRESENT ILLNESS
[FreeTextEntry1] : This patient is seen for an office consultation.  He is a 48-year-old left-handed male who is status post extensive spinal surgery in 1997 including complete spinal fusion and rods.  He has history of chronic neck mid and lower back pain.  He also has a history of chronic tinnitus and deafness in the right ear...    In this setting he did have an episode of COVID virus in May 2023.  This episode was followed by symptomatology including headache cognitive impairment with poor concentration binocular blurring of vision dizziness exacerbation of neck and right shoulder and lower back pain and exacerbation of pre-existing tinnitus.  The patient has been noting insomnia and he has been depressed.  He did see a spine surgeon in has been treating it with meloxicam 15 mg daily and physical therapy has been recommended.  He also saw orthopedics and sports medicine and his PCP.  CAT scan of the brain with and without contrast was normal performed on 6/19/2023.

## 2023-08-10 NOTE — PHYSICAL EXAM
[FreeTextEntry1] : Head:  Normocephalic.  There is decreased cervical dorsal lumbar range of motion with extensive scar from spinal surgery with loss of lumbar lordosis restriction of bilateral straight leg raising.  Mental Status:  Alert Oriented X3 Speech normal and no aphasia or dysarthria.  Cranial Nerves:  PERRL, Visual Fields full  EOMI no diplopia no ptosis no nystagmus, V through XII intact.  Motor: There is distal left lower extremity atrophy and weakness of left foot dorsiflexion and extensor hallucis.  Strength is otherwise basically intact.  DTRs: Symmetric and 2+ except for absent ankle reflexes bilaterally.  Plantars neutral.  No Clonus.  Sensory: Reduced pin and vibration distally in the lowers more on the left.  Gait: Antalgic.

## 2023-08-10 NOTE — REASON FOR VISIT
[Initial Evaluation] : an initial evaluation [FreeTextEntry1] : Chronic pain tinnitus and post COVID syndrome

## 2023-08-14 ENCOUNTER — APPOINTMENT (OUTPATIENT)
Dept: FAMILY MEDICINE | Facility: CLINIC | Age: 48
End: 2023-08-14
Payer: COMMERCIAL

## 2023-08-14 VITALS
OXYGEN SATURATION: 98 % | DIASTOLIC BLOOD PRESSURE: 85 MMHG | SYSTOLIC BLOOD PRESSURE: 120 MMHG | HEART RATE: 100 BPM | RESPIRATION RATE: 16 BRPM | TEMPERATURE: 98.9 F | BODY MASS INDEX: 26.34 KG/M2 | HEIGHT: 70 IN | WEIGHT: 184 LBS

## 2023-08-14 DIAGNOSIS — F32.A DEPRESSION, UNSPECIFIED: ICD-10-CM

## 2023-08-14 PROCEDURE — 99214 OFFICE O/P EST MOD 30 MIN: CPT

## 2023-08-14 NOTE — HEALTH RISK ASSESSMENT
[Several Days (1)] : 5.) Poor appetite or overeating? Several days [Nearly Every Day (3)] : 6.) Feeling bad about yourself, or that you are a failure, or have let yourself or your family down? Nearly every day [1/2 of Days or More (2)] : 8.) Moving or speaking so slowly that other people could have noticed, or the opposite, moving or speaking faster than usual? Half the days or more [Not at All (0)] : 9.) Thoughts that you would be off dead or of hurting yourself in some way? Not at all [Moderately Severe] : severity of depression is moderately severe [[id number]] : 19

## 2023-08-14 NOTE — HISTORY OF PRESENT ILLNESS
[FreeTextEntry1] : follow-up   [de-identified] : 47 yo male PMH HTN (diet controlled), Morales's esophagus, diverticulitis, BPH, former smoker (quit 2014 - smoked10-15 years 1/2 pack a day) hearing loss presenting today for follow-up.  Since our last visit, he has seen ortho/sports medicine - has started PT.   He also saw his previous orthopedic surgeon (Dr. Nestor Patel) - also recommended PT/NSAIDS, he has follow-up this week.   He has also seen neuro (Dr. Slater), now is taking gabapentin along with Meloxicam,  He has also seen ENT, has upcoming audiology.

## 2023-08-14 NOTE — ASSESSMENT
[FreeTextEntry1] : 49 yo male PMH HTN (diet controlled), Morales's esophagus, diverticulitis, BPH, former smoker (quit 2014 - smoked10-15 years 1/2 pack a day) hearing loss presenting today for follow-up.

## 2023-08-14 NOTE — PLAN
[FreeTextEntry1] : -he will continue with PT, following with ortho/neuro/ENT   -Depression:  PHQ9 positive, we have decided to start sertraline 25 mg QD, call/f/u if any side effects, f/u in 1 month, discussed calling insurance company to see if any therapists are covered   -advised to keep up with HCM, follow-up with GI (history of Morales's/screenings, follow-up with Derm for skin cancer screening  -when asked about chronic cough, states has had for years, attributes to Barett's -f/u Chest X-ray, f/u with Pulm   -follow-up in 1 month or sooner

## 2023-08-17 ENCOUNTER — APPOINTMENT (OUTPATIENT)
Dept: OTOLARYNGOLOGY | Facility: CLINIC | Age: 48
End: 2023-08-17
Payer: COMMERCIAL

## 2023-08-17 VITALS — HEIGHT: 70 IN | WEIGHT: 184 LBS | BODY MASS INDEX: 26.34 KG/M2

## 2023-08-17 PROCEDURE — 92567 TYMPANOMETRY: CPT

## 2023-08-17 PROCEDURE — 92625 TINNITUS ASSESSMENT: CPT

## 2023-08-17 PROCEDURE — 92557 COMPREHENSIVE HEARING TEST: CPT

## 2023-08-17 PROCEDURE — 92504 EAR MICROSCOPY EXAMINATION: CPT

## 2023-08-17 PROCEDURE — 99213 OFFICE O/P EST LOW 20 MIN: CPT

## 2023-08-18 NOTE — CONSULT LETTER
[Dear  ___] : Dear  [unfilled], [Consult Letter:] : I had the pleasure of evaluating your patient, [unfilled]. [Please see my note below.] : Please see my note below. [Consult Closing:] : Thank you very much for allowing me to participate in the care of this patient.  If you have any questions, please do not hesitate to contact me. [Sincerely,] : Sincerely, [FreeTextEntry2] : Dr Leny Garza [FreeTextEntry3] : Shelley hCen MD, Otology Neurotology & Skull Base Surgery

## 2023-08-18 NOTE — PHYSICAL EXAM
[Binocular Microscopic Exam] : Binocular microscopic exam was performed [Hearing Albarran Test (Tuning Fork On Forehead)] : no lateralization of tone [Midline] : trachea located in midline position [Normal] : no rashes [Hearing Loss Right Only] : normal [Hearing Loss Left Only] : normal [Rinne Test Air Conduction Persists > Bone Conduction Right] : bone conduction greater than air conduction on the right [Rinne Test Air Conduction Persists > Bone Conduction Left] : bone conduction greater than air conduction on the left [Nystagmus] : ~T no ~M nystagmus was seen [Fukuda Step Test] : Fukuda Step Test was Negative [Romberg's Sign] : Romberg's sign was absent [Fistula Sign] : Fistula Sign: Negative [Past-Pointing] : Past-Pointing: Negative [Karthikeyan-Hallraffaeleke] : Han[place]-Hallpike: Negative

## 2023-08-18 NOTE — HISTORY OF PRESENT ILLNESS
[Tinnitus] : tinnitus [Headache] : headache [Hearing Loss] : hearing loss [Lightheadedness] : lightheadedness [de-identified] :  48 year male who presents with hearing loss.   Referred by Dr Leny Garza on 07/27/23 History that he has decreased hearing and tinnitus in his right ear since he was 3 years old.  Presents today for worsening left ear tinnitus since having COVID in May.  He reports that he has decreased hearing and tinnitus in his right ear since he was 3 years old.  He reports a louder, buzzing noise in his right ear - different tone from the left.     Has hearing aids but does not help so does not wear it. CT Head 06/19/23- normal  No recent audio

## 2023-08-18 NOTE — REASON FOR VISIT
[Initial Consultation] : an initial consultation for [FreeTextEntry2] : referred by Dr Leny Garza for tinnitus

## 2023-08-18 NOTE — PROCEDURE
[Sudden Hearing Loss] : Sudden Hearing Loss [Same] : same as the Pre Op Dx. [FreeTextEntry6] : Operative microscope was used to examine the ear canal, ear drum and visible middle ear landmarks. Adequate exam would not have been possible without the use of a microscope. Findings are described.

## 2023-08-18 NOTE — DATA REVIEWED
[de-identified] : An audiogram was ordered and performed including TPM, pure tones, tympanometry and speech testing for the patients complaint of hearing loss, tinnitus I have independently reviewed the patient's audiogram from today and my findings include colette SNHL, R asymmetry and SDS poor TPM to 8k, 51dB

## 2023-08-22 ENCOUNTER — APPOINTMENT (OUTPATIENT)
Dept: FAMILY MEDICINE | Facility: CLINIC | Age: 48
End: 2023-08-22
Payer: COMMERCIAL

## 2023-08-22 VITALS
SYSTOLIC BLOOD PRESSURE: 138 MMHG | HEART RATE: 112 BPM | TEMPERATURE: 96.6 F | BODY MASS INDEX: 25.77 KG/M2 | WEIGHT: 180 LBS | RESPIRATION RATE: 16 BRPM | DIASTOLIC BLOOD PRESSURE: 80 MMHG | OXYGEN SATURATION: 98 % | HEIGHT: 70 IN

## 2023-08-22 DIAGNOSIS — B34.9 VIRAL INFECTION, UNSPECIFIED: ICD-10-CM

## 2023-08-22 PROCEDURE — 99213 OFFICE O/P EST LOW 20 MIN: CPT

## 2023-08-22 NOTE — PHYSICAL EXAM
[Normal Sclera/Conjunctiva] : normal sclera/conjunctiva [Normal Outer Ear/Nose] : the outer ears and nose were normal in appearance [Normal Oropharynx] : the oropharynx was normal [No Lymphadenopathy] : no lymphadenopathy [Supple] : supple [Normal] : normal rate, regular rhythm, normal S1 and S2 and no murmur heard [No Edema] : there was no peripheral edema [No Extremity Clubbing/Cyanosis] : no extremity clubbing/cyanosis [Soft] : abdomen soft [Non Tender] : non-tender [Non-distended] : non-distended [No Rash] : no rash [Alert and Oriented x3] : oriented to person, place, and time [Normal Mood] : the mood was normal [Normal Insight/Judgement] : insight and judgment were intact [de-identified] : + scoliosis

## 2023-08-22 NOTE — ASSESSMENT
[FreeTextEntry1] : # viral illness symptoms non-specific, no change over last week Likely viral, monitor symptoms, stay well-hydrated, tylenol/motrin PRN If symptoms worsen or have no improved after another week can call for further eval  f/u PRN

## 2023-08-22 NOTE — REVIEW OF SYSTEMS
[Fever] : fever [Fatigue] : fatigue [Night Sweats] : night sweats [Recent Change In Weight] : ~T recent weight change [Nausea] : nausea [Constipation] : constipation [Headache] : headache [Chills] : no chills [Lower Ext Edema] : no lower extremity edema [Shortness Of Breath] : no shortness of breath [Cough] : no cough [Diarrhea] : no diarrhea [Vomiting] : no vomiting [Dysuria] : no dysuria [Easy Bleeding] : no easy bleeding [Easy Bruising] : no easy bruising [Swollen Glands] : no swollen glands [FreeTextEntry2] : Decreased appetite

## 2023-08-22 NOTE — HISTORY OF PRESENT ILLNESS
[FreeTextEntry8] : Pt has been having intermittent fevers to 101.5 F over last week. Happens more at night. Has been taking tylenol for fevers. Had nausea, chest tightness (alleviated w/ deep breathing), headaches, neck pain, insomnia, fatigue. Minimal coughing, dry. No congestion, ear pain, sore throat, swollen glands, diarrhea. No change in symptoms since they started.   Multiple home COVID tests negative in past week. Thinks he may have gotten it from a friend the night before he started having symptoms.

## 2023-08-26 ENCOUNTER — EMERGENCY (EMERGENCY)
Facility: HOSPITAL | Age: 48
LOS: 1 days | Discharge: HOME | End: 2023-08-26
Attending: STUDENT IN AN ORGANIZED HEALTH CARE EDUCATION/TRAINING PROGRAM | Admitting: STUDENT IN AN ORGANIZED HEALTH CARE EDUCATION/TRAINING PROGRAM
Payer: COMMERCIAL

## 2023-08-26 VITALS
TEMPERATURE: 98 F | RESPIRATION RATE: 16 BRPM | HEART RATE: 84 BPM | OXYGEN SATURATION: 98 % | DIASTOLIC BLOOD PRESSURE: 91 MMHG | SYSTOLIC BLOOD PRESSURE: 129 MMHG

## 2023-08-26 VITALS
SYSTOLIC BLOOD PRESSURE: 137 MMHG | WEIGHT: 179.9 LBS | RESPIRATION RATE: 18 BRPM | HEART RATE: 94 BPM | TEMPERATURE: 98 F | HEIGHT: 70 IN | DIASTOLIC BLOOD PRESSURE: 93 MMHG | OXYGEN SATURATION: 97 %

## 2023-08-26 DIAGNOSIS — Z98.890 OTHER SPECIFIED POSTPROCEDURAL STATES: Chronic | ICD-10-CM

## 2023-08-26 DIAGNOSIS — Z41.9 ENCOUNTER FOR PROCEDURE FOR PURPOSES OTHER THAN REMEDYING HEALTH STATE, UNSPECIFIED: Chronic | ICD-10-CM

## 2023-08-26 LAB
ALBUMIN SERPL ELPH-MCNC: 3.4 G/DL — SIGNIFICANT CHANGE UP (ref 3.3–5)
ALP SERPL-CCNC: 62 U/L — SIGNIFICANT CHANGE UP (ref 40–120)
ALT FLD-CCNC: 26 U/L — SIGNIFICANT CHANGE UP (ref 10–45)
ANION GAP SERPL CALC-SCNC: 4 MMOL/L — LOW (ref 5–17)
APPEARANCE UR: CLEAR — SIGNIFICANT CHANGE UP
AST SERPL-CCNC: 27 U/L — SIGNIFICANT CHANGE UP (ref 10–40)
BASOPHILS # BLD AUTO: 0.03 K/UL — SIGNIFICANT CHANGE UP (ref 0–0.2)
BASOPHILS NFR BLD AUTO: 0.6 % — SIGNIFICANT CHANGE UP (ref 0–2)
BILIRUB SERPL-MCNC: 0.6 MG/DL — SIGNIFICANT CHANGE UP (ref 0.2–1.2)
BILIRUB UR-MCNC: NEGATIVE — SIGNIFICANT CHANGE UP
BUN SERPL-MCNC: 14 MG/DL — SIGNIFICANT CHANGE UP (ref 7–23)
CALCIUM SERPL-MCNC: 9.2 MG/DL — SIGNIFICANT CHANGE UP (ref 8.4–10.5)
CHLORIDE SERPL-SCNC: 104 MMOL/L — SIGNIFICANT CHANGE UP (ref 96–108)
CO2 SERPL-SCNC: 32 MMOL/L — HIGH (ref 22–31)
COLOR SPEC: YELLOW — SIGNIFICANT CHANGE UP
CREAT SERPL-MCNC: 0.84 MG/DL — SIGNIFICANT CHANGE UP (ref 0.5–1.3)
DIFF PNL FLD: NEGATIVE — SIGNIFICANT CHANGE UP
EGFR: 108 ML/MIN/1.73M2 — SIGNIFICANT CHANGE UP
EOSINOPHIL # BLD AUTO: 0.08 K/UL — SIGNIFICANT CHANGE UP (ref 0–0.5)
EOSINOPHIL NFR BLD AUTO: 1.6 % — SIGNIFICANT CHANGE UP (ref 0–6)
GLUCOSE SERPL-MCNC: 101 MG/DL — HIGH (ref 70–99)
GLUCOSE UR QL: NEGATIVE MG/DL — SIGNIFICANT CHANGE UP
HCT VFR BLD CALC: 43.3 % — SIGNIFICANT CHANGE UP (ref 39–50)
HGB BLD-MCNC: 15.1 G/DL — SIGNIFICANT CHANGE UP (ref 13–17)
IMM GRANULOCYTES NFR BLD AUTO: 0.4 % — SIGNIFICANT CHANGE UP (ref 0–0.9)
KETONES UR-MCNC: NEGATIVE MG/DL — SIGNIFICANT CHANGE UP
LEUKOCYTE ESTERASE UR-ACNC: NEGATIVE — SIGNIFICANT CHANGE UP
LIDOCAIN IGE QN: 81 U/L — HIGH (ref 16–77)
LYMPHOCYTES # BLD AUTO: 1.56 K/UL — SIGNIFICANT CHANGE UP (ref 1–3.3)
LYMPHOCYTES # BLD AUTO: 30.8 % — SIGNIFICANT CHANGE UP (ref 13–44)
MCHC RBC-ENTMCNC: 30.4 PG — SIGNIFICANT CHANGE UP (ref 27–34)
MCHC RBC-ENTMCNC: 34.9 GM/DL — SIGNIFICANT CHANGE UP (ref 32–36)
MCV RBC AUTO: 87.3 FL — SIGNIFICANT CHANGE UP (ref 80–100)
MONOCYTES # BLD AUTO: 0.62 K/UL — SIGNIFICANT CHANGE UP (ref 0–0.9)
MONOCYTES NFR BLD AUTO: 12.3 % — SIGNIFICANT CHANGE UP (ref 2–14)
NEUTROPHILS # BLD AUTO: 2.75 K/UL — SIGNIFICANT CHANGE UP (ref 1.8–7.4)
NEUTROPHILS NFR BLD AUTO: 54.3 % — SIGNIFICANT CHANGE UP (ref 43–77)
NITRITE UR-MCNC: NEGATIVE — SIGNIFICANT CHANGE UP
NRBC # BLD: 0 /100 WBCS — SIGNIFICANT CHANGE UP (ref 0–0)
PH UR: 6.5 — SIGNIFICANT CHANGE UP (ref 5–8)
PLATELET # BLD AUTO: 184 K/UL — SIGNIFICANT CHANGE UP (ref 150–400)
POTASSIUM SERPL-MCNC: 4.2 MMOL/L — SIGNIFICANT CHANGE UP (ref 3.5–5.3)
POTASSIUM SERPL-SCNC: 4.2 MMOL/L — SIGNIFICANT CHANGE UP (ref 3.5–5.3)
PROT SERPL-MCNC: 7.5 G/DL — SIGNIFICANT CHANGE UP (ref 6–8.3)
PROT UR-MCNC: NEGATIVE MG/DL — SIGNIFICANT CHANGE UP
RAPID RVP RESULT: SIGNIFICANT CHANGE UP
RBC # BLD: 4.96 M/UL — SIGNIFICANT CHANGE UP (ref 4.2–5.8)
RBC # FLD: 11.7 % — SIGNIFICANT CHANGE UP (ref 10.3–14.5)
SARS-COV-2 RNA SPEC QL NAA+PROBE: SIGNIFICANT CHANGE UP
SODIUM SERPL-SCNC: 140 MMOL/L — SIGNIFICANT CHANGE UP (ref 135–145)
SP GR SPEC: 1.02 — SIGNIFICANT CHANGE UP (ref 1–1.03)
UROBILINOGEN FLD QL: 0.2 MG/DL — SIGNIFICANT CHANGE UP (ref 0.2–1)
WBC # BLD: 5.06 K/UL — SIGNIFICANT CHANGE UP (ref 3.8–10.5)
WBC # FLD AUTO: 5.06 K/UL — SIGNIFICANT CHANGE UP (ref 3.8–10.5)

## 2023-08-26 PROCEDURE — 36415 COLL VENOUS BLD VENIPUNCTURE: CPT

## 2023-08-26 PROCEDURE — 99285 EMERGENCY DEPT VISIT HI MDM: CPT

## 2023-08-26 PROCEDURE — 0225U NFCT DS DNA&RNA 21 SARSCOV2: CPT

## 2023-08-26 PROCEDURE — 99284 EMERGENCY DEPT VISIT MOD MDM: CPT | Mod: 25

## 2023-08-26 PROCEDURE — 85025 COMPLETE CBC W/AUTO DIFF WBC: CPT

## 2023-08-26 PROCEDURE — G1004: CPT

## 2023-08-26 PROCEDURE — 74177 CT ABD & PELVIS W/CONTRAST: CPT | Mod: ME

## 2023-08-26 PROCEDURE — 83690 ASSAY OF LIPASE: CPT

## 2023-08-26 PROCEDURE — 74177 CT ABD & PELVIS W/CONTRAST: CPT | Mod: 26,ME

## 2023-08-26 PROCEDURE — 71046 X-RAY EXAM CHEST 2 VIEWS: CPT

## 2023-08-26 PROCEDURE — 87086 URINE CULTURE/COLONY COUNT: CPT

## 2023-08-26 PROCEDURE — 80053 COMPREHEN METABOLIC PANEL: CPT

## 2023-08-26 PROCEDURE — 71046 X-RAY EXAM CHEST 2 VIEWS: CPT | Mod: 26

## 2023-08-26 RX ORDER — SODIUM CHLORIDE 9 MG/ML
1000 INJECTION INTRAMUSCULAR; INTRAVENOUS; SUBCUTANEOUS ONCE
Refills: 0 | Status: COMPLETED | OUTPATIENT
Start: 2023-08-26 | End: 2023-08-26

## 2023-08-26 RX ORDER — FOLIC ACID 0.8 MG
400 TABLET ORAL
Qty: 0 | Refills: 0 | DISCHARGE

## 2023-08-26 RX ORDER — CYST/ALA/Q10/PHOS.SER/DHA/BROC 100-20-50
100 POWDER (GRAM) ORAL
Qty: 0 | Refills: 0 | DISCHARGE

## 2023-08-26 RX ADMIN — SODIUM CHLORIDE 2000 MILLILITER(S): 9 INJECTION INTRAMUSCULAR; INTRAVENOUS; SUBCUTANEOUS at 14:11

## 2023-08-26 NOTE — ED ADULT NURSE NOTE - CHIEF COMPLAINT QUOTE
Pt c/o intermittent fever with nausea/right sided abdominal pain x 9 days, h/o Inderjit Guerra/West Nile virus years ago, [place], took 2 extra strength Tylenol 1 hour ago

## 2023-08-26 NOTE — ED PROVIDER NOTE - OBJECTIVE STATEMENT
48-year-old female with history of depression presents to the ED complaining of intermittent fevers for the past 9 days, nausea, 1 episode of diarrhea yesterday and right-sided lower abdominal pain for the past few weeks.  Patient states he has been feeling weak and tired.  Patient explains that after having COVID in May he has been having tinnitus, diagnosed with optic neuritis and migraines.  Patient had an outpatient MRI of brain and ear yesterday, results pending.  Patient states that he is overall not feeling well.  Patient states he had a temperature of 99.7 today.  No fever yesterday.  No recent travel or sick contacts.  Denies cough, congestion, chest pain, shortness of breath, vomiting, urinary symptoms, flank pain. Patient took tylenol 1 hour ago. Patient states he previous had Inderjit bare and West Nile Virus years ago. 48-year-old male with history of depression presents to the ED complaining of intermittent fevers for the past 9 days, nausea, 1 episode of diarrhea yesterday and right-sided lower abdominal pain for the past few weeks. Patient explains that after having COVID in May he has been having tinnitus, diagnosed with optic neuritis and migraines.  Patient had an outpatient MRI of brain and ear yesterday, results pending.   Patient states he had a temperature of 99.7 today.  No fever yesterday.  No recent travel or sick contacts.  Denies cough, congestion, chest pain, shortness of breath, vomiting, urinary symptoms, flank pain. Patient took tylenol 1 hour ago. Patient states he previous had Inderjit bare and West Nile Virus years ago.

## 2023-08-26 NOTE — ED PROVIDER NOTE - ATTENDING APP SHARED VISIT CONTRIBUTION OF CARE
48-year-old male with history of depression presents to the ED complaining of intermittent fevers for the past 9 days, nausea, 1 episode of diarrhea yesterday and right-sided lower abdominal pain for the past few weeks.  no recent travel, ill contacts, pt denies any cp, palpitations, sob, v/d, dysuria, numbness, headache, neck pain  fever are intermittent, on and off for 1 week. last fever was 2-3 days ago    Vital signs reviewed  GENERAL: Patient nontoxic appearing, NAD  HEAD: NCAT  EYES: Anicteric  ENT: MMM  NECK: Supple, non tender  RESPIRATORY: Normal respiratory effort. CTA B/L. No wheezing, rales, rhonchi  CARDIOVASCULAR: Regular rate and rhythm  ABDOMEN: Soft. Nondistended. Nontender. No guarding or rebound. No CVA tenderness.  MUSCULOSKELETAL/EXTREMITIES: Brisk cap refill. 2+ radial pulses. No leg edema.  SKIN:  Warm and dry  NEURO: AAOx3. No gross FND.  PSYCHIATRIC: Cooperative. Affect appropriate.     Patient well appearing, nontoxic.  Given history and exam, low suspicion for serious bacterial infection including but not limited to meningitis, pneumonia, UTI or bacteremia. given abd pain will r/o infectious cause with ct, labs, cxr  Likely viral etiology.

## 2023-08-26 NOTE — ED PROVIDER NOTE - PROGRESS NOTE DETAILS
Pt well appearing, nontoxic, HDS. Patient advised of exam and study findings incidental findings on ct   Patient understands and agrees with the plan of discharge and to follow up with pmd  Return precautions discussed.  Patient verbalized full understanding.   Patient comfortable going home.  Strict return precautions to the ER for any worsening abd pain, persistent fevers or any new concerning sxs to come back to er

## 2023-08-26 NOTE — ED ADULT NURSE NOTE - NSFALLUNIVINTERV_ED_ALL_ED
Bed/Stretcher in lowest position, wheels locked, appropriate side rails in place/Call bell, personal items and telephone in reach/Instruct patient to call for assistance before getting out of bed/chair/stretcher/Non-slip footwear applied when patient is off stretcher/[place] to call system/Physically safe environment - no spills, clutter or unnecessary equipment/Purposeful proactive rounding/Room/bathroom lighting operational, light cord in reach

## 2023-08-26 NOTE — ED ADULT NURSE NOTE - OBJECTIVE STATEMENT
Pt c/o intermittent fever with nausea/right sided abdominal pain x 9 days, h/o Inderjit Guerra/West Nile virus years ago, [place], took 2 extra strength Tylenol 1 hour ago

## 2023-08-26 NOTE — ED ADULT TRIAGE NOTE - CHIEF COMPLAINT QUOTE
Pt c/o intermittent fever with nausea/right sided abdominal pain x 9 days, h/o Inderjit Guerra/West Nile virus years ago, [place] Pt c/o intermittent fever with nausea/right sided abdominal pain x 9 days, h/o Inderjit Guerra/West Nile virus years ago, [place], took 2 extra strength Tylenol 1 hour ago

## 2023-08-26 NOTE — ED PROVIDER NOTE - PATIENT PORTAL LINK FT
90.7 You can access the FollowMyHealth Patient Portal offered by [facility] by registering at the following website: [email or web address]. By joining [email or web address]’s FollowMyHealth portal, you will also be able to view your health information using other applications (apps) compatible with our system.

## 2023-08-26 NOTE — ED PROVIDER NOTE - CLINICAL SUMMARY MEDICAL DECISION MAKING FREE TEXT BOX
48-year-old female with history of depression presents to the ED complaining of intermittent fevers for the past 9 days, nausea, 1 episode of diarrhea yesterday and right-sided lower abdominal pain for the past few weeks. PE: exam unremarkable, A/P: labs, rvp, ua/cx, ct abd/pelvis - workup unremarkable. recommend close pmd follow up.

## 2023-08-28 LAB
CULTURE RESULTS: SIGNIFICANT CHANGE UP
SPECIMEN SOURCE: SIGNIFICANT CHANGE UP

## 2023-09-06 ENCOUNTER — APPOINTMENT (OUTPATIENT)
Dept: FAMILY MEDICINE | Facility: CLINIC | Age: 48
End: 2023-09-06
Payer: SELF-PAY

## 2023-09-06 VITALS
BODY MASS INDEX: 25.48 KG/M2 | RESPIRATION RATE: 16 BRPM | OXYGEN SATURATION: 98 % | DIASTOLIC BLOOD PRESSURE: 92 MMHG | HEART RATE: 120 BPM | WEIGHT: 178 LBS | TEMPERATURE: 98.2 F | SYSTOLIC BLOOD PRESSURE: 130 MMHG | HEIGHT: 70 IN

## 2023-09-06 PROCEDURE — 99214 OFFICE O/P EST MOD 30 MIN: CPT

## 2023-09-06 NOTE — HISTORY OF PRESENT ILLNESS
[FreeTextEntry1] : follow-up   [de-identified] : 49 yo male PMH HTN (diet controlled), Morales's esophagus, diverticulitis, BPH, former smoker (quit 2014 - smoked10-15 years 1/2 pack a day) hearing loss presenting today for follow-up.  He reports he has had fevers for 2 months, can be at nighttime or daytime.   His symptoms initially started when he had covid. He still has insomnia, fatigue, muscle aches, migraines, blurry vision when he tries to focus.   Admits to night sweats. Denies loss of appetite.   He has not been taking the sertraline as prescribed, has been taking it "as needed."

## 2023-09-06 NOTE — ASSESSMENT
[FreeTextEntry1] : 47 yo male PMH HTN (diet controlled), Morales's esophagus, diverticulitis, BPH, former smoker (quit 2014 - smoked10-15 years 1/2 pack a day) hearing loss presenting today for follow-up.

## 2023-09-06 NOTE — REVIEW OF SYSTEMS
[Fever] : no fever [Fatigue] : fatigue [Night Sweats] : night sweats [Recent Change In Weight] : ~T no recent weight change

## 2023-09-11 NOTE — ED PROVIDER NOTE - PMH
musculoskeletal
Barretts esophagus    Deaf, right    Diverticulitis    Inderjit Guerra infection    Scoliosis

## 2023-09-15 ENCOUNTER — APPOINTMENT (OUTPATIENT)
Dept: FAMILY MEDICINE | Facility: HOSPITAL | Age: 48
End: 2023-09-15
Payer: MEDICAID

## 2023-09-15 ENCOUNTER — OUTPATIENT (OUTPATIENT)
Dept: OUTPATIENT SERVICES | Facility: HOSPITAL | Age: 48
LOS: 1 days | End: 2023-09-15
Payer: MEDICAID

## 2023-09-15 VITALS
BODY MASS INDEX: 25.25 KG/M2 | HEART RATE: 98 BPM | TEMPERATURE: 98.4 F | WEIGHT: 176 LBS | RESPIRATION RATE: 17 BRPM | OXYGEN SATURATION: 98 % | DIASTOLIC BLOOD PRESSURE: 85 MMHG | SYSTOLIC BLOOD PRESSURE: 155 MMHG

## 2023-09-15 VITALS — SYSTOLIC BLOOD PRESSURE: 134 MMHG | DIASTOLIC BLOOD PRESSURE: 79 MMHG

## 2023-09-15 DIAGNOSIS — Z41.9 ENCOUNTER FOR PROCEDURE FOR PURPOSES OTHER THAN REMEDYING HEALTH STATE, UNSPECIFIED: Chronic | ICD-10-CM

## 2023-09-15 DIAGNOSIS — Z00.00 ENCOUNTER FOR GENERAL ADULT MEDICAL EXAMINATION WITHOUT ABNORMAL FINDINGS: ICD-10-CM

## 2023-09-15 DIAGNOSIS — I49.9 CARDIAC ARRHYTHMIA, UNSPECIFIED: ICD-10-CM

## 2023-09-15 DIAGNOSIS — R50.9 FEVER, UNSPECIFIED: ICD-10-CM

## 2023-09-15 DIAGNOSIS — Z98.890 OTHER SPECIFIED POSTPROCEDURAL STATES: Chronic | ICD-10-CM

## 2023-09-15 PROCEDURE — 93010 ELECTROCARDIOGRAM REPORT: CPT

## 2023-09-15 RX ORDER — SENNOSIDES 8.6 MG/1
8.6 TABLET, COATED ORAL
Qty: 12 | Refills: 0 | Status: DISCONTINUED | COMMUNITY
Start: 2022-10-03 | End: 2023-09-15

## 2023-09-16 PROCEDURE — 87177 OVA AND PARASITES SMEARS: CPT

## 2023-09-16 PROCEDURE — 85652 RBC SED RATE AUTOMATED: CPT

## 2023-09-16 PROCEDURE — 85025 COMPLETE CBC W/AUTO DIFF WBC: CPT

## 2023-09-16 PROCEDURE — 86480 TB TEST CELL IMMUN MEASURE: CPT

## 2023-09-16 PROCEDURE — 87040 BLOOD CULTURE FOR BACTERIA: CPT

## 2023-09-16 PROCEDURE — 86622 BRUCELLA ANTIBODY: CPT

## 2023-09-16 PROCEDURE — 86618 LYME DISEASE ANTIBODY: CPT

## 2023-09-16 PROCEDURE — 36415 COLL VENOUS BLD VENIPUNCTURE: CPT

## 2023-09-16 PROCEDURE — 87077 CULTURE AEROBIC IDENTIFY: CPT

## 2023-09-16 PROCEDURE — 87046 STOOL CULTR AEROBIC BACT EA: CPT

## 2023-09-16 PROCEDURE — 87476 LYME DIS DNA AMP PROBE: CPT

## 2023-09-16 PROCEDURE — G0463: CPT

## 2023-09-16 PROCEDURE — 93005 ELECTROCARDIOGRAM TRACING: CPT

## 2023-09-16 PROCEDURE — 86140 C-REACTIVE PROTEIN: CPT

## 2023-09-16 PROCEDURE — 87045 FECES CULTURE AEROBIC BACT: CPT

## 2023-09-17 LAB — HIV1+2 AB SPEC QL IA.RAPID: NONREACTIVE

## 2023-09-22 ENCOUNTER — OUTPATIENT (OUTPATIENT)
Dept: OUTPATIENT SERVICES | Facility: HOSPITAL | Age: 48
LOS: 1 days | End: 2023-09-22
Payer: MEDICAID

## 2023-09-22 ENCOUNTER — LABORATORY RESULT (OUTPATIENT)
Age: 48
End: 2023-09-22

## 2023-09-22 ENCOUNTER — APPOINTMENT (OUTPATIENT)
Dept: FAMILY MEDICINE | Facility: HOSPITAL | Age: 48
End: 2023-09-22

## 2023-09-22 VITALS
SYSTOLIC BLOOD PRESSURE: 138 MMHG | WEIGHT: 176 LBS | BODY MASS INDEX: 25.2 KG/M2 | HEART RATE: 88 BPM | OXYGEN SATURATION: 97 % | RESPIRATION RATE: 17 BRPM | HEIGHT: 70 IN | TEMPERATURE: 97.8 F | DIASTOLIC BLOOD PRESSURE: 71 MMHG

## 2023-09-22 DIAGNOSIS — Z00.00 ENCOUNTER FOR GENERAL ADULT MEDICAL EXAMINATION WITHOUT ABNORMAL FINDINGS: ICD-10-CM

## 2023-09-22 DIAGNOSIS — R50.9 FEVER, UNSPECIFIED: ICD-10-CM

## 2023-09-22 DIAGNOSIS — Z98.890 OTHER SPECIFIED POSTPROCEDURAL STATES: Chronic | ICD-10-CM

## 2023-09-22 DIAGNOSIS — Z41.9 ENCOUNTER FOR PROCEDURE FOR PURPOSES OTHER THAN REMEDYING HEALTH STATE, UNSPECIFIED: Chronic | ICD-10-CM

## 2023-09-22 PROCEDURE — G0463: CPT

## 2023-09-22 PROCEDURE — 36415 COLL VENOUS BLD VENIPUNCTURE: CPT

## 2023-09-22 PROCEDURE — 86431 RHEUMATOID FACTOR QUANT: CPT

## 2023-09-22 PROCEDURE — 86038 ANTINUCLEAR ANTIBODIES: CPT

## 2023-09-22 PROCEDURE — 86036 ANCA SCREEN EACH ANTIBODY: CPT

## 2023-09-22 PROCEDURE — 86160 COMPLEMENT ANTIGEN: CPT

## 2023-09-25 ENCOUNTER — APPOINTMENT (OUTPATIENT)
Dept: FAMILY MEDICINE | Facility: CLINIC | Age: 48
End: 2023-09-25

## 2023-09-25 LAB
ANA SER IF-ACNC: NEGATIVE
RHEUMATOID FACT SER QL: 30 IU/ML

## 2023-09-27 LAB
C3 SERPL-MCNC: 132 MG/DL
C4 SERPL-MCNC: 26 MG/DL

## 2023-09-28 ENCOUNTER — APPOINTMENT (OUTPATIENT)
Dept: NEUROLOGY | Facility: CLINIC | Age: 48
End: 2023-09-28

## 2023-09-28 ENCOUNTER — NON-APPOINTMENT (OUTPATIENT)
Age: 48
End: 2023-09-28

## 2023-10-02 ENCOUNTER — APPOINTMENT (OUTPATIENT)
Dept: PULMONOLOGY | Facility: CLINIC | Age: 48
End: 2023-10-02
Payer: MEDICAID

## 2023-10-02 DIAGNOSIS — R05.9 COUGH, UNSPECIFIED: ICD-10-CM

## 2023-10-02 DIAGNOSIS — R07.89 OTHER CHEST PAIN: ICD-10-CM

## 2023-10-02 PROCEDURE — ZZZZZ: CPT

## 2023-10-02 PROCEDURE — 99204 OFFICE O/P NEW MOD 45 MIN: CPT | Mod: 25

## 2023-10-02 PROCEDURE — 94010 BREATHING CAPACITY TEST: CPT

## 2023-10-02 PROCEDURE — 71046 X-RAY EXAM CHEST 2 VIEWS: CPT

## 2023-10-02 PROCEDURE — 94726 PLETHYSMOGRAPHY LUNG VOLUMES: CPT

## 2023-10-02 PROCEDURE — 94729 DIFFUSING CAPACITY: CPT

## 2023-10-03 LAB — DEPRECATED D DIMER PPP IA-ACNC: 323 NG/ML DDU

## 2023-10-04 ENCOUNTER — APPOINTMENT (OUTPATIENT)
Dept: PULMONOLOGY | Facility: CLINIC | Age: 48
End: 2023-10-04

## 2023-10-08 LAB
B BURGDOR AB SER-IMP: NEGATIVE
B BURGDOR DNA SPEC QL NAA+PROBE: NEGATIVE
B BURGDOR IGG+IGM SER QL: 0.2 INDEX
BACTERIA BLD CULT: NORMAL
BACTERIA BLD CULT: NORMAL
BACTERIA STL CULT: NORMAL
BASOPHILS # BLD AUTO: 0.03 K/UL
BASOPHILS NFR BLD AUTO: 0.5 %
BRUCELLA AB IGG, EIA: NEGATIVE
BRUCELLA AB IGM, EIA: NEGATIVE
CRP SERPL-MCNC: 17 MG/L
DEPRECATED O AND P PREP STL: NORMAL
EOSINOPHIL # BLD AUTO: 0.03 K/UL
EOSINOPHIL NFR BLD AUTO: 0.5 %
ERYTHROCYTE [SEDIMENTATION RATE] IN BLOOD BY WESTERGREN METHOD: 28 MM/HR
HCT VFR BLD CALC: 44.8 %
HGB BLD-MCNC: 15.5 G/DL
IMM GRANULOCYTES NFR BLD AUTO: 0.4 %
LYMPHOCYTES # BLD AUTO: 1.35 K/UL
LYMPHOCYTES NFR BLD AUTO: 24 %
M TB IFN-G BLD-IMP: NEGATIVE
MAN DIFF?: NORMAL
MCHC RBC-ENTMCNC: 30.3 PG
MCHC RBC-ENTMCNC: 34.6 GM/DL
MCV RBC AUTO: 87.5 FL
MONOCYTES # BLD AUTO: 0.51 K/UL
MONOCYTES NFR BLD AUTO: 9.1 %
NEUTROPHILS # BLD AUTO: 3.68 K/UL
NEUTROPHILS NFR BLD AUTO: 65.5 %
PLATELET # BLD AUTO: 196 K/UL
QUANTIFERON TB PLUS MITOGEN MINUS NIL: 3.03 IU/ML
QUANTIFERON TB PLUS NIL: 0.15 IU/ML
QUANTIFERON TB PLUS TB1 MINUS NIL: 0.01 IU/ML
QUANTIFERON TB PLUS TB2 MINUS NIL: 0.01 IU/ML
RBC # BLD: 5.12 M/UL
RBC # FLD: 11.7 %
WBC # FLD AUTO: 5.62 K/UL

## 2023-10-25 ENCOUNTER — APPOINTMENT (OUTPATIENT)
Dept: FAMILY MEDICINE | Facility: HOSPITAL | Age: 48
End: 2023-10-25

## 2023-10-25 ENCOUNTER — OUTPATIENT (OUTPATIENT)
Dept: OUTPATIENT SERVICES | Facility: HOSPITAL | Age: 48
LOS: 1 days | End: 2023-10-25
Payer: MEDICAID

## 2023-10-25 VITALS
BODY MASS INDEX: 26.83 KG/M2 | SYSTOLIC BLOOD PRESSURE: 126 MMHG | WEIGHT: 187 LBS | OXYGEN SATURATION: 96 % | DIASTOLIC BLOOD PRESSURE: 84 MMHG | TEMPERATURE: 97.8 F | RESPIRATION RATE: 17 BRPM | HEART RATE: 73 BPM

## 2023-10-25 DIAGNOSIS — Z41.9 ENCOUNTER FOR PROCEDURE FOR PURPOSES OTHER THAN REMEDYING HEALTH STATE, UNSPECIFIED: Chronic | ICD-10-CM

## 2023-10-25 DIAGNOSIS — Z00.00 ENCOUNTER FOR GENERAL ADULT MEDICAL EXAMINATION WITHOUT ABNORMAL FINDINGS: ICD-10-CM

## 2023-10-25 DIAGNOSIS — Z00.00 ENCOUNTER FOR GENERAL ADULT MEDICAL EXAMINATION W/OUT ABNORMAL FINDINGS: ICD-10-CM

## 2023-10-25 DIAGNOSIS — Z98.890 OTHER SPECIFIED POSTPROCEDURAL STATES: Chronic | ICD-10-CM

## 2023-10-25 DIAGNOSIS — G47.00 INSOMNIA, UNSPECIFIED: ICD-10-CM

## 2023-10-25 PROCEDURE — G0463: CPT

## 2023-11-02 ENCOUNTER — NON-APPOINTMENT (OUTPATIENT)
Age: 48
End: 2023-11-02

## 2023-11-10 ENCOUNTER — RESULT REVIEW (OUTPATIENT)
Age: 48
End: 2023-11-10

## 2023-11-10 ENCOUNTER — APPOINTMENT (OUTPATIENT)
Dept: GASTROENTEROLOGY | Facility: CLINIC | Age: 48
End: 2023-11-10
Payer: MEDICAID

## 2023-11-10 VITALS
BODY MASS INDEX: 26.48 KG/M2 | HEART RATE: 87 BPM | WEIGHT: 185 LBS | OXYGEN SATURATION: 97 % | DIASTOLIC BLOOD PRESSURE: 81 MMHG | SYSTOLIC BLOOD PRESSURE: 131 MMHG | HEIGHT: 70 IN

## 2023-11-10 DIAGNOSIS — Z87.19 PERSONAL HISTORY OF OTHER DISEASES OF THE DIGESTIVE SYSTEM: ICD-10-CM

## 2023-11-10 DIAGNOSIS — K29.80 DUODENITIS W/OUT BLEEDING: ICD-10-CM

## 2023-11-10 DIAGNOSIS — R19.7 DIARRHEA, UNSPECIFIED: ICD-10-CM

## 2023-11-10 DIAGNOSIS — U09.9 POST COVID-19 CONDITION, UNSPECIFIED: ICD-10-CM

## 2023-11-10 DIAGNOSIS — R13.10 DYSPHAGIA, UNSPECIFIED: ICD-10-CM

## 2023-11-10 DIAGNOSIS — R50.9 FEVER, UNSPECIFIED: ICD-10-CM

## 2023-11-10 DIAGNOSIS — K80.20 CALCULUS OF GALLBLADDER W/OUT CHOLECYSTITIS W/OUT OBSTRUCTION: ICD-10-CM

## 2023-11-10 DIAGNOSIS — K62.5 HEMORRHAGE OF ANUS AND RECTUM: ICD-10-CM

## 2023-11-10 DIAGNOSIS — K57.30 DIVERTICULOSIS OF LARGE INTESTINE W/OUT PERFORATION OR ABSCESS W/OUT BLEEDING: ICD-10-CM

## 2023-11-10 PROCEDURE — 99203 OFFICE O/P NEW LOW 30 MIN: CPT

## 2023-11-13 ENCOUNTER — APPOINTMENT (OUTPATIENT)
Dept: DERMATOLOGY | Facility: CLINIC | Age: 48
End: 2023-11-13
Payer: MEDICAID

## 2023-11-13 DIAGNOSIS — D22.9 MELANOCYTIC NEVI, UNSPECIFIED: ICD-10-CM

## 2023-11-13 PROCEDURE — 99204 OFFICE O/P NEW MOD 45 MIN: CPT

## 2023-11-13 RX ORDER — KETOCONAZOLE 20.5 MG/ML
2 SHAMPOO, SUSPENSION TOPICAL
Qty: 1 | Refills: 4 | Status: ACTIVE | COMMUNITY
Start: 2023-11-13 | End: 1900-01-01

## 2023-11-13 RX ORDER — HYDROCORTISONE 25 MG/G
2.5 CREAM TOPICAL
Qty: 1 | Refills: 1 | Status: ACTIVE | COMMUNITY
Start: 2023-11-13 | End: 1900-01-01

## 2023-11-16 ENCOUNTER — APPOINTMENT (OUTPATIENT)
Dept: OTOLARYNGOLOGY | Facility: CLINIC | Age: 48
End: 2023-11-16
Payer: MEDICAID

## 2023-11-16 VITALS
HEIGHT: 70 IN | HEART RATE: 91 BPM | DIASTOLIC BLOOD PRESSURE: 80 MMHG | BODY MASS INDEX: 26.48 KG/M2 | WEIGHT: 185 LBS | SYSTOLIC BLOOD PRESSURE: 129 MMHG

## 2023-11-16 DIAGNOSIS — H93.19 TINNITUS, UNSPECIFIED EAR: ICD-10-CM

## 2023-11-16 PROCEDURE — 92504 EAR MICROSCOPY EXAMINATION: CPT

## 2023-11-16 PROCEDURE — 99213 OFFICE O/P EST LOW 20 MIN: CPT

## 2023-11-16 RX ORDER — SERTRALINE 25 MG/1
25 TABLET, FILM COATED ORAL DAILY
Qty: 90 | Refills: 0 | Status: ACTIVE | COMMUNITY
Start: 2023-08-14

## 2023-11-27 ENCOUNTER — NON-APPOINTMENT (OUTPATIENT)
Age: 48
End: 2023-11-27

## 2024-01-02 ENCOUNTER — NON-APPOINTMENT (OUTPATIENT)
Age: 49
End: 2024-01-02

## 2024-01-02 ENCOUNTER — APPOINTMENT (OUTPATIENT)
Dept: RHEUMATOLOGY | Facility: CLINIC | Age: 49
End: 2024-01-02
Payer: MEDICAID

## 2024-01-02 VITALS
HEIGHT: 70 IN | OXYGEN SATURATION: 98 % | TEMPERATURE: 97.3 F | BODY MASS INDEX: 29.49 KG/M2 | SYSTOLIC BLOOD PRESSURE: 158 MMHG | WEIGHT: 206 LBS | DIASTOLIC BLOOD PRESSURE: 60 MMHG | HEART RATE: 65 BPM

## 2024-01-02 DIAGNOSIS — M25.552 PAIN IN RIGHT HIP: ICD-10-CM

## 2024-01-02 DIAGNOSIS — Z86.69 PERSONAL HISTORY OF OTHER DISEASES OF THE NERVOUS SYSTEM AND SENSE ORGANS: ICD-10-CM

## 2024-01-02 DIAGNOSIS — Z82.0 FAMILY HISTORY OF EPILEPSY AND OTHER DISEASES OF THE NERVOUS SYSTEM: ICD-10-CM

## 2024-01-02 DIAGNOSIS — M25.551 PAIN IN RIGHT HIP: ICD-10-CM

## 2024-01-02 PROCEDURE — 99204 OFFICE O/P NEW MOD 45 MIN: CPT

## 2024-01-02 RX ORDER — MELOXICAM 15 MG/1
TABLET ORAL
Refills: 0 | Status: DISCONTINUED | COMMUNITY
End: 2024-01-02

## 2024-01-04 ENCOUNTER — NON-APPOINTMENT (OUTPATIENT)
Age: 49
End: 2024-01-04

## 2024-01-04 DIAGNOSIS — R89.9 UNSPECIFIED ABNORMAL FINDING IN SPECIMENS FROM OTHER ORGANS, SYSTEMS AND TISSUES: ICD-10-CM

## 2024-01-04 LAB
24R-OH-CALCIDIOL SERPL-MCNC: 49.8 PG/ML
25(OH)D3 SERPL-MCNC: 49.9 NG/ML
ACE BLD-CCNC: 97 U/L
ALBUMIN SERPL ELPH-MCNC: 4.5 G/DL
ALP BLD-CCNC: 71 U/L
ALT SERPL-CCNC: 36 U/L
ANION GAP SERPL CALC-SCNC: 13 MMOL/L
AST SERPL-CCNC: 34 U/L
BASOPHILS # BLD AUTO: 0.04 K/UL
BASOPHILS NFR BLD AUTO: 0.6 %
BILIRUB SERPL-MCNC: 0.6 MG/DL
BUN SERPL-MCNC: 11 MG/DL
CALCIUM SERPL-MCNC: 9.6 MG/DL
CCP AB SER IA-ACNC: <8 UNITS
CHLORIDE SERPL-SCNC: 100 MMOL/L
CO2 SERPL-SCNC: 27 MMOL/L
CREAT SERPL-MCNC: 0.86 MG/DL
EGFR: 107 ML/MIN/1.73M2
ENA RNP AB SER IA-ACNC: <0.2 AL
ENA SM AB SER IA-ACNC: <0.2 AL
ENA SS-A AB SER IA-ACNC: 0.3 AL
ENA SS-B AB SER IA-ACNC: 1.1 AL
EOSINOPHIL # BLD AUTO: 0.18 K/UL
EOSINOPHIL NFR BLD AUTO: 2.5 %
ERYTHROCYTE [SEDIMENTATION RATE] IN BLOOD BY WESTERGREN METHOD: 9 MM/HR
GLUCOSE SERPL-MCNC: 85 MG/DL
HCT VFR BLD CALC: 48.3 %
HGB BLD-MCNC: 16.9 G/DL
IMM GRANULOCYTES NFR BLD AUTO: 0.3 %
LYMPHOCYTES # BLD AUTO: 1.58 K/UL
LYMPHOCYTES NFR BLD AUTO: 22.3 %
MAN DIFF?: NORMAL
MCHC RBC-ENTMCNC: 29.9 PG
MCHC RBC-ENTMCNC: 35 GM/DL
MCV RBC AUTO: 85.3 FL
MONOCYTES # BLD AUTO: 0.54 K/UL
MONOCYTES NFR BLD AUTO: 7.6 %
NEUTROPHILS # BLD AUTO: 4.74 K/UL
NEUTROPHILS NFR BLD AUTO: 66.7 %
PLATELET # BLD AUTO: 167 K/UL
POTASSIUM SERPL-SCNC: 4.5 MMOL/L
PROT SERPL-MCNC: 7.5 G/DL
RBC # BLD: 5.66 M/UL
RBC # FLD: 13.3 %
RF+CCP IGG SER-IMP: NEGATIVE
RHEUMATOID FACT SER QL: 24 IU/ML
SODIUM SERPL-SCNC: 140 MMOL/L
T PALLIDUM AB SER QL IA: NEGATIVE
WBC # FLD AUTO: 7.1 K/UL

## 2024-01-11 ENCOUNTER — OUTPATIENT (OUTPATIENT)
Dept: OUTPATIENT SERVICES | Facility: HOSPITAL | Age: 49
LOS: 1 days | End: 2024-01-11
Payer: MEDICAID

## 2024-01-11 ENCOUNTER — APPOINTMENT (OUTPATIENT)
Dept: RADIOLOGY | Facility: CLINIC | Age: 49
End: 2024-01-11
Payer: MEDICAID

## 2024-01-11 ENCOUNTER — APPOINTMENT (OUTPATIENT)
Dept: CT IMAGING | Facility: CLINIC | Age: 49
End: 2024-01-11
Payer: MEDICAID

## 2024-01-11 DIAGNOSIS — Z98.890 OTHER SPECIFIED POSTPROCEDURAL STATES: Chronic | ICD-10-CM

## 2024-01-11 DIAGNOSIS — Z41.9 ENCOUNTER FOR PROCEDURE FOR PURPOSES OTHER THAN REMEDYING HEALTH STATE, UNSPECIFIED: Chronic | ICD-10-CM

## 2024-01-11 DIAGNOSIS — R89.9 UNSPECIFIED ABNORMAL FINDING IN SPECIMENS FROM OTHER ORGANS, SYSTEMS AND TISSUES: ICD-10-CM

## 2024-01-11 PROCEDURE — 73562 X-RAY EXAM OF KNEE 3: CPT

## 2024-01-11 PROCEDURE — 73560 X-RAY EXAM OF KNEE 1 OR 2: CPT | Mod: 26,50

## 2024-01-11 PROCEDURE — 73522 X-RAY EXAM HIPS BI 3-4 VIEWS: CPT | Mod: 26

## 2024-01-11 PROCEDURE — 73522 X-RAY EXAM HIPS BI 3-4 VIEWS: CPT

## 2024-01-11 PROCEDURE — 71250 CT THORAX DX C-: CPT

## 2024-01-11 NOTE — ASSESSMENT
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   Chronic pains likely are in setting of multiple prior injuries and hx of spinal surgery- a lot of his tenderness is over muscle rather than joint.   Notably RF and ESR slightly elevated in 9/2023; though nonspecific. Pt states he had COVID infection in 5/2023 which prompted increased tinnitus as well as overall body weakness subsequently- this could raise either inflammatory marker. Will repeat RF and ESR and also check more specific RA test (CCP), although I advised patient that my suspicion for RA is low given lack of joint tenderness, warmth or synovitis on exam; most of his tenderness is in the muscles rather than the joints.  Noted that many tests for optic neuritis were already completed including Lyme, HIV testing; will check syphilis testing as well, and test for sarcoidosis (ACE and 1,25 Vitamin D). SLE is unlikely as ASHLEY checked in 9/2023 is negative.   Will check Xrays of b/l hips and b/l knees given pain in those joints, especially on R. side.  Patient given neurology referral for evaluation of chronic nerve related pain, recent diagnosis of optic neuritis, as well and family history of MS in sister (to see if there is any concern for MS in this patient).   Further plan to follow pending results of above tests.   Addendum 1/11/24:  Given patient reported ongoing dyspnea at his office visit, and his serum ACE level was elevated, it is important to obtain chest imaging to rule out pulmonary sarcoidosis (sarcoidosis may also cause optic neuritis). a chest Xray was noted to be done in 8/2023 showing no acute pulmonary disease, but CT chest may be more sensitive in detecting changes. It is also beneficial to obtain CT chest given his dyspnea and long term smoking history.

## 2024-01-11 NOTE — REVIEW OF SYSTEMS
[Recent Weight Gain (___ Lbs)] : recent [unfilled] ~Ulb weight gain [Eyesight Problems] : eyesight problems [Dry Eyes] : dryness of the eyes [Arthralgias] : arthralgias [Joint Pain] : joint pain [Joint Swelling] : joint swelling [Joint Stiffness] : joint stiffness [As Noted in HPI] : as noted in HPI [Negative] : Heme/Lymph [Recent Weight Loss (___ Lbs)] : no recent weight loss [de-identified] : nerve pain down spine and R. side of neck

## 2024-01-11 NOTE — HISTORY OF PRESENT ILLNESS
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 side. seborrheic dermatitis taking ketoconazole. no oral or nasal ulcers. no unintentional weight loss, rather weight gain 25 lbs in past couple of months. blurry vision in both eyes.  hx of spine surgery in 1997 also mother passed away in last 3-4 years, while carrying her casket he had strain and injured his R. ankle as well.  fell on tailbone on black ice 2010.   works at [employer] for past 1-2 decades but now has to stop working due to disability. uses cane to walk, holds cane with L. hand, has been using cane for past year. COVID 5/2023, after that he felt weaker in legs and migraines, with pain into neck.   Was diagnosed with optic neuritis in R. eye in 11/2023.  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  tinnitus is in L. ear, which has increased recently. ever sicne COVID infection has been getting tinnitus in deaf R ear as well. Wears hearing aid in one ear.   No pain or swelling in MCP, PIP or DIP joints.  mild pain in L. wrist. No pain or swelling in elbows, but +stiffness. Stiffness both when he wakes up lasting 30-45 minutes, and also at the end of the day after activities.  Feels R knee swollen compared to L. knee. r. ankle also painful. r. side painful ever since he felt a popping sensation in the hip.   Family history: sister has multiple sclerosis.  [Weight Loss] : no weight loss [Fever] : no fever [Chills] : no chills [Malar Facial Rash] : no malar facial rash [Skin Lesions] : no lesions [Skin Nodules] : no skin nodules [Oral Ulcers] : no oral ulcers [Dry Mouth] : no dry mouth [Shortness of Breath] : no shortness of breath [Eye Pain] : no eye pain [Eye Redness] : no eye redness

## 2024-01-11 NOTE — PHYSICAL EXAM
[General Appearance - Alert] : alert [General Appearance - In No Acute Distress] : in no acute distress [General Appearance - Well Developed] : well developed [Sclera] : the sclera and conjunctiva were normal [Extraocular Movements] : extraocular movements were intact [Exaggerated Use Of Accessory Muscles For Inspiration] : no accessory muscle use [Edema] : there was no peripheral edema [No Spinal Tenderness] : no spinal tenderness [Skin Color & Pigmentation] : normal skin color and pigmentation [] : no rash [Skin Lesions] : no skin lesions [No Focal Deficits] : no focal deficits [Oriented To Time, Place, And Person] : oriented to person, place, and time [Affect] : the affect was normal [Mood] : the mood was normal [FreeTextEntry1] : no warmth or synovitis noted in any joint; no tenderness in b/l elbows, wrists, MCP, PIP or DIP joints; knees or ankles; no effusions noted in either knee and full ROM noted of the knees.

## 2024-01-22 ENCOUNTER — NON-APPOINTMENT (OUTPATIENT)
Age: 49
End: 2024-01-22

## 2024-01-23 ENCOUNTER — OUTPATIENT (OUTPATIENT)
Dept: OUTPATIENT SERVICES | Facility: HOSPITAL | Age: 49
LOS: 1 days | End: 2024-01-23
Payer: MEDICAID

## 2024-01-23 ENCOUNTER — APPOINTMENT (OUTPATIENT)
Dept: CT IMAGING | Facility: CLINIC | Age: 49
End: 2024-01-23
Payer: MEDICAID

## 2024-01-23 DIAGNOSIS — Z98.890 OTHER SPECIFIED POSTPROCEDURAL STATES: Chronic | ICD-10-CM

## 2024-01-23 DIAGNOSIS — R89.9 UNSPECIFIED ABNORMAL FINDING IN SPECIMENS FROM OTHER ORGANS, SYSTEMS AND TISSUES: ICD-10-CM

## 2024-01-23 DIAGNOSIS — Z41.9 ENCOUNTER FOR PROCEDURE FOR PURPOSES OTHER THAN REMEDYING HEALTH STATE, UNSPECIFIED: Chronic | ICD-10-CM

## 2024-01-23 PROCEDURE — 71250 CT THORAX DX C-: CPT | Mod: 26

## 2024-01-23 PROCEDURE — 71250 CT THORAX DX C-: CPT

## 2024-01-24 ENCOUNTER — OUTPATIENT (OUTPATIENT)
Dept: OUTPATIENT SERVICES | Facility: HOSPITAL | Age: 49
LOS: 1 days | End: 2024-01-24
Payer: MEDICAID

## 2024-01-24 ENCOUNTER — APPOINTMENT (OUTPATIENT)
Dept: RADIOLOGY | Facility: HOSPITAL | Age: 49
End: 2024-01-24
Payer: MEDICAID

## 2024-01-24 DIAGNOSIS — Z41.9 ENCOUNTER FOR PROCEDURE FOR PURPOSES OTHER THAN REMEDYING HEALTH STATE, UNSPECIFIED: Chronic | ICD-10-CM

## 2024-01-24 DIAGNOSIS — Z98.890 OTHER SPECIFIED POSTPROCEDURAL STATES: Chronic | ICD-10-CM

## 2024-01-24 DIAGNOSIS — R13.10 DYSPHAGIA, UNSPECIFIED: ICD-10-CM

## 2024-01-24 DIAGNOSIS — Z00.00 ENCOUNTER FOR GENERAL ADULT MEDICAL EXAMINATION WITHOUT ABNORMAL FINDINGS: ICD-10-CM

## 2024-01-24 PROCEDURE — 74221 X-RAY XM ESOPHAGUS 2CNTRST: CPT

## 2024-01-24 PROCEDURE — 74221 X-RAY XM ESOPHAGUS 2CNTRST: CPT | Mod: 26

## 2024-01-31 ENCOUNTER — NON-APPOINTMENT (OUTPATIENT)
Age: 49
End: 2024-01-31

## 2024-02-01 ENCOUNTER — OUTPATIENT (OUTPATIENT)
Dept: OUTPATIENT SERVICES | Facility: HOSPITAL | Age: 49
LOS: 1 days | End: 2024-02-01
Payer: MEDICAID

## 2024-02-01 ENCOUNTER — RESULT REVIEW (OUTPATIENT)
Age: 49
End: 2024-02-01

## 2024-02-01 ENCOUNTER — NON-APPOINTMENT (OUTPATIENT)
Age: 49
End: 2024-02-01

## 2024-02-01 ENCOUNTER — TRANSCRIPTION ENCOUNTER (OUTPATIENT)
Age: 49
End: 2024-02-01

## 2024-02-01 ENCOUNTER — APPOINTMENT (OUTPATIENT)
Dept: GASTROENTEROLOGY | Facility: HOSPITAL | Age: 49
End: 2024-02-01

## 2024-02-01 DIAGNOSIS — Z98.890 OTHER SPECIFIED POSTPROCEDURAL STATES: Chronic | ICD-10-CM

## 2024-02-01 DIAGNOSIS — R13.10 DYSPHAGIA, UNSPECIFIED: ICD-10-CM

## 2024-02-01 DIAGNOSIS — Z41.9 ENCOUNTER FOR PROCEDURE FOR PURPOSES OTHER THAN REMEDYING HEALTH STATE, UNSPECIFIED: Chronic | ICD-10-CM

## 2024-02-01 PROCEDURE — 88313 SPECIAL STAINS GROUP 2: CPT | Mod: 26

## 2024-02-01 PROCEDURE — 88305 TISSUE EXAM BY PATHOLOGIST: CPT

## 2024-02-01 PROCEDURE — 88312 SPECIAL STAINS GROUP 1: CPT | Mod: 26

## 2024-02-01 PROCEDURE — 43239 EGD BIOPSY SINGLE/MULTIPLE: CPT

## 2024-02-01 PROCEDURE — 88312 SPECIAL STAINS GROUP 1: CPT

## 2024-02-01 PROCEDURE — 88313 SPECIAL STAINS GROUP 2: CPT

## 2024-02-01 PROCEDURE — 88305 TISSUE EXAM BY PATHOLOGIST: CPT | Mod: 26

## 2024-02-01 DEVICE — ESOPHAGEAL BALLOON CATH CRE FIXED WIRE 6-7-8MM: Type: IMPLANTABLE DEVICE | Status: FUNCTIONAL

## 2024-02-02 LAB — SURGICAL PATHOLOGY STUDY: SIGNIFICANT CHANGE UP

## 2024-02-05 ENCOUNTER — NON-APPOINTMENT (OUTPATIENT)
Age: 49
End: 2024-02-05

## 2024-02-05 LAB — G6PD SER-CCNC: 12.3 U/G HGB

## 2024-02-05 RX ORDER — HYDROXYCHLOROQUINE SULFATE 200 MG/1
200 TABLET, FILM COATED ORAL
Qty: 120 | Refills: 2 | Status: ACTIVE | COMMUNITY
Start: 2024-02-05 | End: 1900-01-01

## 2024-03-01 ENCOUNTER — OUTPATIENT (OUTPATIENT)
Dept: OUTPATIENT SERVICES | Facility: HOSPITAL | Age: 49
LOS: 1 days | End: 2024-03-01
Payer: MEDICAID

## 2024-03-01 ENCOUNTER — APPOINTMENT (OUTPATIENT)
Dept: FAMILY MEDICINE | Facility: HOSPITAL | Age: 49
End: 2024-03-01

## 2024-03-01 VITALS
RESPIRATION RATE: 16 BRPM | DIASTOLIC BLOOD PRESSURE: 92 MMHG | SYSTOLIC BLOOD PRESSURE: 145 MMHG | TEMPERATURE: 97.8 F | HEART RATE: 88 BPM | OXYGEN SATURATION: 96 %

## 2024-03-01 DIAGNOSIS — Z98.890 OTHER SPECIFIED POSTPROCEDURAL STATES: Chronic | ICD-10-CM

## 2024-03-01 DIAGNOSIS — Z02.89 ENCOUNTER FOR OTHER ADMINISTRATIVE EXAMINATIONS: ICD-10-CM

## 2024-03-01 DIAGNOSIS — Z41.9 ENCOUNTER FOR PROCEDURE FOR PURPOSES OTHER THAN REMEDYING HEALTH STATE, UNSPECIFIED: Chronic | ICD-10-CM

## 2024-03-01 DIAGNOSIS — Z00.00 ENCOUNTER FOR GENERAL ADULT MEDICAL EXAMINATION WITHOUT ABNORMAL FINDINGS: ICD-10-CM

## 2024-03-01 PROCEDURE — G0463: CPT

## 2024-03-01 NOTE — HISTORY OF PRESENT ILLNESS
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 Pt is feeling ok, with no new sxs, other than chronic neck/shoulder pain, and needs form completion for disability, however does not yet have definitive diagnosis from rheumatology or neurology at this point. Pt denies fevers, chills, confusion, n/v, new onset motor/sensory deficits.

## 2024-03-01 NOTE — PHYSICAL EXAM
[No Acute Distress] : no acute distress [Well-Appearing] : well-appearing [Normal Voice/Communication] : normal voice/communication [Normal Sclera/Conjunctiva] : normal sclera/conjunctiva [Normal Outer Ear/Nose] : the outer ears and nose were normal in appearance [Supple] : supple [No Accessory Muscle Use] : no accessory muscle use [No Respiratory Distress] : no respiratory distress  [Clear to Auscultation] : lungs were clear to auscultation bilaterally [Normal Rate] : normal rate  [Regular Rhythm] : with a regular rhythm [Normal S1, S2] : normal S1 and S2 [Non Tender] : non-tender [Soft] : abdomen soft [Non-distended] : non-distended [de-identified] : ttp to b/l upper trapezius muscles and paraspinal muscles of the neck

## 2024-03-04 ENCOUNTER — APPOINTMENT (OUTPATIENT)
Dept: DERMATOLOGY | Facility: CLINIC | Age: 49
End: 2024-03-04
Payer: MEDICAID

## 2024-03-04 DIAGNOSIS — L21.9 SEBORRHEIC DERMATITIS, UNSPECIFIED: ICD-10-CM

## 2024-03-04 PROCEDURE — 99213 OFFICE O/P EST LOW 20 MIN: CPT

## 2024-03-04 NOTE — PHYSICAL EXAM
[Alert] : alert [Oriented x 3] : ~L oriented x 3 [Well Nourished] : well nourished [Conjunctiva Non-injected] : conjunctiva non-injected [No Visual Lymphadenopathy] : no visual  lymphadenopathy [No Clubbing] : no clubbing [No Edema] : no edema [No Bromhidrosis] : no bromhidrosis [No Chromhidrosis] : no chromhidrosis [FreeTextEntry3] : Greasy scale in the eyebrows and chest - improved

## 2024-03-04 NOTE — HISTORY OF PRESENT ILLNESS
[FreeTextEntry1] : f/u seb derm [de-identified] : 49M here in f/u of seb derm. Improved with topicals. Happy with control

## 2024-03-05 ENCOUNTER — APPOINTMENT (OUTPATIENT)
Dept: RHEUMATOLOGY | Facility: CLINIC | Age: 49
End: 2024-03-05
Payer: MEDICAID

## 2024-03-05 VITALS
OXYGEN SATURATION: 96 % | HEIGHT: 70 IN | WEIGHT: 207 LBS | TEMPERATURE: 98 F | BODY MASS INDEX: 29.63 KG/M2 | HEART RATE: 93 BPM | DIASTOLIC BLOOD PRESSURE: 72 MMHG | SYSTOLIC BLOOD PRESSURE: 110 MMHG

## 2024-03-05 DIAGNOSIS — M25.50 PAIN IN UNSPECIFIED JOINT: ICD-10-CM

## 2024-03-05 DIAGNOSIS — Z98.1 ARTHRODESIS STATUS: ICD-10-CM

## 2024-03-05 PROCEDURE — G2211 COMPLEX E/M VISIT ADD ON: CPT | Mod: NC,1L

## 2024-03-05 PROCEDURE — 99214 OFFICE O/P EST MOD 30 MIN: CPT

## 2024-03-05 NOTE — ASSESSMENT
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   Chronic pains likely are in setting of multiple prior injuries and hx of spinal surgery- a lot of his tenderness is over muscle rather than joint. Regardless, trial of HCQ was started last month in setting of elevated RF and SSB antibody.  Notably RF and ESR slightly elevated in 9/2023; though nonspecific. Pt states he had COVID infection in 5/2023 which prompted increased tinnitus as well as overall body weakness subsequently- this could raise either inflammatory marker.  CT chest did not show hilar or other lymphadenopathy so suspicion for sarcoidosis is low at this time. This was checked due to elevated serum ACE level after prior visit, and hx of optic neuritis for this patient.   Patient was advised to continue  mg daily and follow up in 2-3 months.  He was given referral to an orthopedic spine surgeon (he used to see one, s/p spinal fusion surgery, however his insurance changed and he could no longer see the previous one). The issues that led to his spinal surgery may be contributing to a lot of his symptoms as well.

## 2024-03-05 NOTE — DATA REVIEWED
[FreeTextEntry1] : CBC, CMP, ESR normal 1/2024 SJogrens antibodies SSB 1.1 (H) Rheumatoid factor 24 (H)

## 2024-03-05 NOTE — REVIEW OF SYSTEMS
[Dry Eyes] : dryness of the eyes [Arthralgias] : arthralgias [Joint Stiffness] : joint stiffness [As Noted in HPI] : as noted in HPI [Negative] : Endocrine [Joint Swelling] : no joint swelling

## 2024-03-05 NOTE — PHYSICAL EXAM
[General Appearance - Alert] : alert [General Appearance - Well Developed] : well developed [General Appearance - In No Acute Distress] : in no acute distress [Sclera] : the sclera and conjunctiva were normal [Extraocular Movements] : extraocular movements were intact [Exaggerated Use Of Accessory Muscles For Inspiration] : no accessory muscle use [Edema] : there was no peripheral edema [No Spinal Tenderness] : no spinal tenderness [Skin Color & Pigmentation] : normal skin color and pigmentation [Skin Lesions] : no skin lesions [] : no rash [No Focal Deficits] : no focal deficits [Oriented To Time, Place, And Person] : oriented to person, place, and time [Mood] : the mood was normal [Affect] : the affect was normal [FreeTextEntry1] : no warmth or synovitis noted in any joint; no tenderness in b/l elbows, wrists, MCP, PIP or DIP joints; knees or ankles; no effusions noted in either knee and full ROM noted of the knees.

## 2024-03-05 NOTE — HISTORY OF PRESENT ILLNESS
[___ Month(s) Ago] : [unfilled] month(s) ago [FreeTextEntry1] : 3/5/24 followup: Patient started on  mg QD last month, due to slightly positive RF and slightly positive Sjogrens antibodies. +Dry eyes, on eye drops. Recent development of glaucoma, saw eye doctor. Still with pain in spine and R. lower back, mild arthralgias also including b/l knees, no joint swelling. No rashes. No oral ulcers.

## 2024-03-07 ENCOUNTER — NON-APPOINTMENT (OUTPATIENT)
Age: 49
End: 2024-03-07

## 2024-03-07 ENCOUNTER — APPOINTMENT (OUTPATIENT)
Dept: ORTHOPEDIC SURGERY | Facility: CLINIC | Age: 49
End: 2024-03-07
Payer: MEDICAID

## 2024-03-07 VITALS — HEIGHT: 70 IN | BODY MASS INDEX: 29.63 KG/M2 | WEIGHT: 207 LBS

## 2024-03-07 DIAGNOSIS — M54.6 PAIN IN THORACIC SPINE: ICD-10-CM

## 2024-03-07 DIAGNOSIS — M51.36 OTHER INTERVERTEBRAL DISC DEGENERATION, LUMBAR REGION: ICD-10-CM

## 2024-03-07 PROCEDURE — 99204 OFFICE O/P NEW MOD 45 MIN: CPT

## 2024-03-07 PROCEDURE — 72100 X-RAY EXAM L-S SPINE 2/3 VWS: CPT

## 2024-03-07 PROCEDURE — 72084 X-RAY EXAM ENTIRE SPI 6/> VW: CPT

## 2024-03-07 PROCEDURE — 72040 X-RAY EXAM NECK SPINE 2-3 VW: CPT

## 2024-03-07 PROCEDURE — 72070 X-RAY EXAM THORAC SPINE 2VWS: CPT

## 2024-03-12 ENCOUNTER — APPOINTMENT (OUTPATIENT)
Dept: PHYSICAL MEDICINE AND REHAB | Facility: CLINIC | Age: 49
End: 2024-03-12

## 2024-03-12 NOTE — HISTORY OF PRESENT ILLNESS
[FreeTextEntry1] : NEELA RIVERA is an 49 year old M here for initial evaluation of [] Mr. RIVERA was sent for consultation by  [] for possible injection.   Pain location: [] Quality: [] Radiation: [] Severity: [] Onset: [] Associated symptoms: [] Numbness: [] Weakness: [] Exacerbated by: [] Improved by: [] Bowel or bladder involvement: []  Denies bowel/bladder dysfunction, saddle anesthesia, fevers, chills, weight loss, night pain, or night sweats at this time.  The pain interferes with function, ADLs and quality of life. Patient had tried Acetaminophen, NSAIDs, prescription pain medications, muscle relaxants without any lasting relief of pain.  Patient had imaging studies to evaluate the pain.  Patient had not had any nerve conduction studies to evaluate the symptoms.  Patient had tried physical therapy, and/or physician directed home exercises, stretching, lifestyle modification for over [] weeks without any significant relief.

## 2024-03-13 ENCOUNTER — NON-APPOINTMENT (OUTPATIENT)
Age: 49
End: 2024-03-13

## 2024-03-15 ENCOUNTER — APPOINTMENT (OUTPATIENT)
Dept: ORTHOPEDIC SURGERY | Facility: CLINIC | Age: 49
End: 2024-03-15
Payer: MEDICAID

## 2024-03-15 VITALS — BODY MASS INDEX: 29.35 KG/M2 | WEIGHT: 205 LBS | HEIGHT: 70 IN

## 2024-03-15 PROCEDURE — 99214 OFFICE O/P EST MOD 30 MIN: CPT

## 2024-03-15 NOTE — DISCUSSION/SUMMARY
[de-identified] : Reviewed his imaging.  We discussed further treatment options.  I recommended CT scans of his thoracic and lumbar spine to evaluate his prior surgery from a fusion standpoint and adjacent level stenosis he may have.  He will follow-up with me afterwards.  We will also get standing alignment scoliosis x-rays when he returns.

## 2024-03-15 NOTE — PHYSICAL EXAM
[Antalgic] : antalgic [Cane] : ambulates with cane [de-identified] : Examination of the thoracic and lumbar spine reveals no midline tenderness palpation, step-offs, or skin lesions.  Healed incision.  Decreased range of motion with respect to flexion, extension, lateral bending, and rotation. No tenderness to palpation of the sciatic notch. No tenderness palpation of the bilateral greater trochanters. No pain with passive internal/external rotation of the hips. No instability of bilateral lower extremities.  Negative EVA. Negative straight leg raise bilaterally. No bowstring. Negative femoral stretch. 5 out of 5 iliopsoas, hip abductors, hips adductors, quadriceps, hamstrings, gastrocsoleus, tibialis anterior, extensor hallucis longus, peroneals. Grossly intact sensation to light touch bilateral lower extremities. 1+ patellar and Achilles reflexes. Downgoing Babinski. No clonus. Intact proprioception. Palpable pulses. No skin lesion and no edema on the right and left lower extremities. [de-identified] : Review of his recent cervical, thoracic, lumbar x-rays reveals thoracolumbar fusion with hook rubin and screw construct.  Scoliosis.

## 2024-03-19 ENCOUNTER — APPOINTMENT (OUTPATIENT)
Dept: OTOLARYNGOLOGY | Facility: CLINIC | Age: 49
End: 2024-03-19

## 2024-03-19 LAB — UREA BREATH TEST QL: NEGATIVE

## 2024-03-20 NOTE — ASU PATIENT PROFILE, ADULT - PRO ARRIVE FROM
-- DO NOT REPLY / DO NOT REPLY ALL --  -- Message is from [employer] Center [employer] ([facility]) --    ONLY TO BE USED WITHIN A REFILL MEDICATION ENCOUNTER    Med Refill  Is the patient currently having any symptoms?: No/Non-Emergent symptoms    Name of medication requested: See pended med    Is this the first request for the medication in the last 48 hours?: Yes    Patient is requesting a medication refill - medication is on active medication list    Patient is currently OUT of the requested medication - sent as HIGH priority    Full name of the provider who ordered the medication: Dr. Marilia Chase     [facility] site name / Account # for provider: [facility] [address]     Preferred Pharmacy: Pharmacy  [facility]/Pharmacy #[id number] [facility] [address] At [facility]    Patient confirmed the above pharmacy as correct?  Yes    Caller Information         Type Contact Phone/Fax    03/20/2024 03:51 PM CDT Phone (Incoming) Chandu Sales (Self) [phone] (M)            Alternative phone number: None    Can a detailed message be left?: Yes        
Medication: pantoprazole (PROTONIX) 40 MG tablet  passed protocol.   Last office visit date: 10/25/2023  Next appointment scheduled?: Yes   Number of refills given: 1    Thank you for calling [facility], your medication(s) has been refilled per your request. You can  your prescription at: [id number] [address] [facility]. If you have additional questions, please contact your provider.  Please do not respond to this message.  
home

## 2024-03-21 LAB
ACE BLD-CCNC: 89 U/L
ALBUMIN SERPL ELPH-MCNC: 4.4 G/DL
ALP BLD-CCNC: 61 U/L
ALT SERPL-CCNC: 35 U/L
ANION GAP SERPL CALC-SCNC: 10 MMOL/L
AST SERPL-CCNC: 28 U/L
BASOPHILS # BLD AUTO: 0.04 K/UL
BASOPHILS NFR BLD AUTO: 0.7 %
BILIRUB SERPL-MCNC: 0.4 MG/DL
BUN SERPL-MCNC: 14 MG/DL
CALCIUM SERPL-MCNC: 9.4 MG/DL
CHLORIDE SERPL-SCNC: 104 MMOL/L
CO2 SERPL-SCNC: 29 MMOL/L
CREAT SERPL-MCNC: 1.05 MG/DL
EGFR: 88 ML/MIN/1.73M2
ENA SS-A AB SER IA-ACNC: <0.2 AL
ENA SS-B AB SER IA-ACNC: 0.8 AL
EOSINOPHIL # BLD AUTO: 0.15 K/UL
EOSINOPHIL NFR BLD AUTO: 2.6 %
ERYTHROCYTE [SEDIMENTATION RATE] IN BLOOD BY WESTERGREN METHOD: 2 MM/HR
GLUCOSE SERPL-MCNC: 87 MG/DL
HCT VFR BLD CALC: 48.9 %
HGB BLD-MCNC: 16.5 G/DL
IMM GRANULOCYTES NFR BLD AUTO: 0.4 %
LYMPHOCYTES # BLD AUTO: 1.66 K/UL
LYMPHOCYTES NFR BLD AUTO: 29.2 %
MAN DIFF?: NORMAL
MCHC RBC-ENTMCNC: 30.4 PG
MCHC RBC-ENTMCNC: 33.7 GM/DL
MCV RBC AUTO: 90.1 FL
MONOCYTES # BLD AUTO: 0.39 K/UL
MONOCYTES NFR BLD AUTO: 6.9 %
NEUTROPHILS # BLD AUTO: 3.43 K/UL
NEUTROPHILS NFR BLD AUTO: 60.2 %
PLATELET # BLD AUTO: 188 K/UL
POTASSIUM SERPL-SCNC: 4.6 MMOL/L
PROT SERPL-MCNC: 7.3 G/DL
RBC # BLD: 5.43 M/UL
RBC # FLD: 13 %
RHEUMATOID FACT SER QL: 21 IU/ML
SODIUM SERPL-SCNC: 143 MMOL/L
WBC # FLD AUTO: 5.69 K/UL

## 2024-03-28 ENCOUNTER — OUTPATIENT (OUTPATIENT)
Dept: OUTPATIENT SERVICES | Facility: HOSPITAL | Age: 49
LOS: 1 days | End: 2024-03-28
Payer: MEDICAID

## 2024-03-28 ENCOUNTER — APPOINTMENT (OUTPATIENT)
Dept: FAMILY MEDICINE | Facility: HOSPITAL | Age: 49
End: 2024-03-28

## 2024-03-28 VITALS
WEIGHT: 216 LBS | RESPIRATION RATE: 16 BRPM | DIASTOLIC BLOOD PRESSURE: 76 MMHG | HEART RATE: 75 BPM | BODY MASS INDEX: 30.99 KG/M2 | OXYGEN SATURATION: 97 % | SYSTOLIC BLOOD PRESSURE: 117 MMHG | TEMPERATURE: 97.5 F

## 2024-03-28 DIAGNOSIS — Z41.9 ENCOUNTER FOR PROCEDURE FOR PURPOSES OTHER THAN REMEDYING HEALTH STATE, UNSPECIFIED: Chronic | ICD-10-CM

## 2024-03-28 DIAGNOSIS — M54.2 CERVICALGIA: ICD-10-CM

## 2024-03-28 DIAGNOSIS — Z00.00 ENCOUNTER FOR GENERAL ADULT MEDICAL EXAMINATION WITHOUT ABNORMAL FINDINGS: ICD-10-CM

## 2024-03-28 DIAGNOSIS — Z98.890 OTHER SPECIFIED POSTPROCEDURAL STATES: Chronic | ICD-10-CM

## 2024-03-28 PROCEDURE — G0463: CPT

## 2024-03-28 RX ORDER — CYCLOBENZAPRINE HYDROCHLORIDE 5 MG/1
5 TABLET, FILM COATED ORAL
Qty: 30 | Refills: 0 | Status: ACTIVE | COMMUNITY
Start: 2024-01-02 | End: 1900-01-01

## 2024-03-29 ENCOUNTER — APPOINTMENT (OUTPATIENT)
Dept: CT IMAGING | Facility: HOSPITAL | Age: 49
End: 2024-03-29
Payer: MEDICAID

## 2024-03-29 ENCOUNTER — OUTPATIENT (OUTPATIENT)
Dept: OUTPATIENT SERVICES | Facility: HOSPITAL | Age: 49
LOS: 1 days | End: 2024-03-29
Payer: MEDICAID

## 2024-03-29 ENCOUNTER — RESULT REVIEW (OUTPATIENT)
Age: 49
End: 2024-03-29

## 2024-03-29 DIAGNOSIS — Z41.9 ENCOUNTER FOR PROCEDURE FOR PURPOSES OTHER THAN REMEDYING HEALTH STATE, UNSPECIFIED: Chronic | ICD-10-CM

## 2024-03-29 DIAGNOSIS — M51.36 OTHER INTERVERTEBRAL DISC DEGENERATION, LUMBAR REGION: ICD-10-CM

## 2024-03-29 DIAGNOSIS — Z98.890 OTHER SPECIFIED POSTPROCEDURAL STATES: Chronic | ICD-10-CM

## 2024-03-29 PROCEDURE — 72128 CT CHEST SPINE W/O DYE: CPT | Mod: 26

## 2024-03-29 PROCEDURE — 72131 CT LUMBAR SPINE W/O DYE: CPT | Mod: 26

## 2024-03-29 PROCEDURE — 72131 CT LUMBAR SPINE W/O DYE: CPT

## 2024-03-29 PROCEDURE — 72128 CT CHEST SPINE W/O DYE: CPT

## 2024-03-30 DIAGNOSIS — M54.2 CERVICALGIA: ICD-10-CM

## 2024-04-01 NOTE — HISTORY OF PRESENT ILLNESS
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 Pt continues to have episodic muscular discomfort/spasms of the neck muscles, associated with decreased range of motion while turning head/tilting head to right. [FreeTextEntry1] : f/u neck pain

## 2024-04-02 ENCOUNTER — APPOINTMENT (OUTPATIENT)
Dept: GASTROENTEROLOGY | Facility: CLINIC | Age: 49
End: 2024-04-02
Payer: MEDICAID

## 2024-04-02 VITALS
DIASTOLIC BLOOD PRESSURE: 90 MMHG | HEIGHT: 70 IN | WEIGHT: 216 LBS | BODY MASS INDEX: 30.92 KG/M2 | OXYGEN SATURATION: 98 % | SYSTOLIC BLOOD PRESSURE: 154 MMHG | HEART RATE: 82 BPM

## 2024-04-02 DIAGNOSIS — K29.50 UNSPECIFIED CHRONIC GASTRITIS W/OUT BLEEDING: ICD-10-CM

## 2024-04-02 DIAGNOSIS — Z87.19 PERSONAL HISTORY OF OTHER DISEASES OF THE DIGESTIVE SYSTEM: ICD-10-CM

## 2024-04-02 DIAGNOSIS — R13.10 DYSPHAGIA, UNSPECIFIED: ICD-10-CM

## 2024-04-02 PROCEDURE — 99213 OFFICE O/P EST LOW 20 MIN: CPT

## 2024-04-02 RX ORDER — OMEPRAZOLE 40 MG/1
40 CAPSULE, DELAYED RELEASE ORAL
Qty: 30 | Refills: 3 | Status: ACTIVE | COMMUNITY
Start: 2024-04-02 | End: 1900-01-01

## 2024-04-02 NOTE — HISTORY OF PRESENT ILLNESS
[de-identified] : Upper endoscopy performed by Dr. Derick aguilar on July 2, 2020 [FreeTextEntry1] : Colonoscopy performed by Dr. Derick aguilar on February 6, 2023

## 2024-04-02 NOTE — PHYSICAL EXAM
[Normal] : normal bowel sounds, non-tender, no masses, soft, no no hepato-splenomegaly [None] : no edema [de-identified] : Deferred

## 2024-04-02 NOTE — REASON FOR VISIT
[Follow-up] : a follow-up of an existing diagnosis [FreeTextEntry1] : Patient is a 49-year-old male who is here for follow-up after having an upper endoscopy performed on February 1, 2024

## 2024-04-02 NOTE — ASSESSMENT
[FreeTextEntry1] : Patient is a 49-year-old male complaining of a globus sensation.  Upper endoscopy revealed a small hiatal hernia, chronic inactive gastritis, negative for helical back to pylori, and negative for eosinophilic esophagitis.  Will place patient on omeprazole 40 mg daily and advised following an antireflux protocol.  Follow-up in 6 months.

## 2024-04-10 ENCOUNTER — APPOINTMENT (OUTPATIENT)
Dept: ORTHOPEDIC SURGERY | Facility: CLINIC | Age: 49
End: 2024-04-10
Payer: MEDICAID

## 2024-04-10 DIAGNOSIS — M54.16 RADICULOPATHY, LUMBAR REGION: ICD-10-CM

## 2024-04-10 DIAGNOSIS — M41.9 SCOLIOSIS, UNSPECIFIED: ICD-10-CM

## 2024-04-10 DIAGNOSIS — M48.07 SPINAL STENOSIS, LUMBOSACRAL REGION: ICD-10-CM

## 2024-04-10 PROCEDURE — 99214 OFFICE O/P EST MOD 30 MIN: CPT

## 2024-04-10 NOTE — DISCUSSION/SUMMARY
[de-identified] : We reviewed his imaging.  We discussed further treatment options.  This point he would like to try some epidural injections for his lumbar spine.  Referral was given.  Follow-up afterwards.

## 2024-04-10 NOTE — PHYSICAL EXAM
[Antalgic] : antalgic [Cane] : ambulates with cane [de-identified] : Examination of the thoracic and lumbar spine reveals no midline tenderness palpation, step-offs, or skin lesions.  Healed incision.  Decreased range of motion with respect to flexion, extension, lateral bending, and rotation. No tenderness to palpation of the sciatic notch. No tenderness palpation of the bilateral greater trochanters. No pain with passive internal/external rotation of the hips. No instability of bilateral lower extremities.  Negative EVA. Negative straight leg raise bilaterally. No bowstring. Negative femoral stretch. 5 out of 5 iliopsoas, hip abductors, hips adductors, quadriceps, hamstrings, gastrocsoleus, tibialis anterior, extensor hallucis longus, peroneals. Grossly intact sensation to light touch bilateral lower extremities. 1+ patellar and Achilles reflexes. Downgoing Babinski. No clonus. Intact proprioception. Palpable pulses. No skin lesion and no edema on the right and left lower extremities. [de-identified] : Review of his recent cervical, thoracic, lumbar x-rays reveals thoracolumbar fusion with hook rubin and screw construct.  Scoliosis.  Review of his CT scan demonstrates solid arthrodesis.  He does appear to have some increased stenosis from L4-S1

## 2024-04-10 NOTE — HISTORY OF PRESENT ILLNESS
[Prolonged Standing] : worsened by prolonged standing [Standing] : worsened by standing [Walking] : worsened by walking [NSAIDs] : relieved by nonsteroidal anti-inflammatory drugs [Rest] : relieved by rest [de-identified] : Mr. Varela is a 49 year old male who  is here for a follow-up visit.  He is here to revew his CT results.  [Physical Therapy] : not relieved by physical therapy [Ataxia] : no ataxia [Incontinence] : no incontinence [Loss of Dexterity] : good dexterity [Urinary Ret.] : no urinary retention [de-identified] : Recumbency exacerbates pain

## 2024-05-06 ENCOUNTER — LABORATORY RESULT (OUTPATIENT)
Age: 49
End: 2024-05-06

## 2024-05-06 ENCOUNTER — APPOINTMENT (OUTPATIENT)
Dept: RHEUMATOLOGY | Facility: CLINIC | Age: 49
End: 2024-05-06
Payer: MEDICAID

## 2024-05-06 VITALS
HEIGHT: 69 IN | DIASTOLIC BLOOD PRESSURE: 76 MMHG | HEART RATE: 101 BPM | SYSTOLIC BLOOD PRESSURE: 128 MMHG | OXYGEN SATURATION: 96 % | WEIGHT: 210 LBS | BODY MASS INDEX: 31.1 KG/M2 | TEMPERATURE: 96.9 F

## 2024-05-06 DIAGNOSIS — R76.8 OTHER SPECIFIED ABNORMAL IMMUNOLOGICAL FINDINGS IN SERUM: ICD-10-CM

## 2024-05-06 DIAGNOSIS — R35.0 FREQUENCY OF MICTURITION: ICD-10-CM

## 2024-05-06 DIAGNOSIS — M25.561 PAIN IN RIGHT KNEE: ICD-10-CM

## 2024-05-06 PROCEDURE — 99214 OFFICE O/P EST MOD 30 MIN: CPT

## 2024-05-06 PROCEDURE — G2211 COMPLEX E/M VISIT ADD ON: CPT | Mod: NC,1L

## 2024-05-06 RX ORDER — NAPROXEN 500 MG/1
500 TABLET ORAL
Qty: 20 | Refills: 0 | Status: ACTIVE | COMMUNITY
Start: 2024-05-06 | End: 1900-01-01

## 2024-05-06 NOTE — PHYSICAL EXAM
[General Appearance - Alert] : alert [General Appearance - In No Acute Distress] : in no acute distress [General Appearance - Well Developed] : well developed [Sclera] : the sclera and conjunctiva were normal [Extraocular Movements] : extraocular movements were intact [Exaggerated Use Of Accessory Muscles For Inspiration] : no accessory muscle use [Edema] : there was no peripheral edema [Musculoskeletal - Swelling] : no joint swelling seen [FreeTextEntry1] : no tenderness in b/l ankles or hands; mild tenderness in R distal medial thigh proximal to the knee [Skin Color & Pigmentation] : normal skin color and pigmentation [] : no rash [Skin Lesions] : no skin lesions [No Focal Deficits] : no focal deficits [Oriented To Time, Place, And Person] : oriented to person, place, and time [Affect] : the affect was normal [Mood] : the mood was normal

## 2024-05-06 NOTE — HISTORY OF PRESENT ILLNESS
[___ Month(s) Ago] : [unfilled] month(s) ago [FreeTextEntry1] : 5/6/24 followup: Patient stopped taking HCQ 2 weeks ago due to acute R. ankle swelling and pain as well as fever intermittent to 101.6F that lasted for over a week.  Some ongoing pain in medial R. knee as well- in soft tissue distribution- Xray of R. knee in 1/2024 showed no arthritis.  He has been taking potassium and other supplements to try to manage his symptoms. He stopped smoking weed as well.  He has no prior diagnosis of gout but wondering if the R. ankle symptoms are from gout. He had been eating burgers every other day, leading up to the ankle pain. No significant alcohol or shellfish intake. No soda or organ meat intake.

## 2024-05-06 NOTE — REVIEW OF SYSTEMS
[As Noted in HPI] : as noted in HPI [Arthralgias] : arthralgias [Joint Pain] : joint pain [Joint Swelling] : joint swelling [Joint Stiffness] : joint stiffness [Negative] : Heme/Lymph

## 2024-05-06 NOTE — ASSESSMENT
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  Chronic pains likely are in setting of multiple prior injuries and hx of spinal surgery- a lot of his tenderness is over muscle rather than joint. Regardless, trial of HCQ was started months ago in setting of elevated RF and SSB antibody. Patient reports no significant relief with this medication taken for at least 3 months. Now off of it for the past 2 weeks.  Notably RF and ESR slightly elevated in 9/2023; though nonspecific. Pt states he had COVID infection in 5/2023 which prompted increased tinnitus as well as overall body weakness subsequently- this could raise either inflammatory marker. CT chest 1/2024 did not show hilar or other lymphadenopathy so suspicion for sarcoidosis is low at this time. This was checked due to elevated serum ACE level after prior visit, and hx of optic neuritis for this patient.  Plan: Repeat labs today, include uric acid to evaluate for gout given red meat intake prior to his R. ankle pain starting. Will also repeat RF, Sjogrens abs, etc. as they were elevated before.  Will send patient for R. knee MRI due to ongoing pain in medial aspect of R. knee/distal right thigh. Continue to hold HCQ as patient had taken it for at least 3 months with no significant relief in symptoms- if inflammatory arthritis still suspected, and not due to gout, would consider MTX.  Patient advised to discontinue his supplements- he is taking potassium supplements- he was advised to stop as his potassium was normal in 3/2024 but will check it again- advised to discontinue charcoal extract supplement which he is taking- these substances could be harming him as well.

## 2024-05-09 LAB — BACTERIA UR CULT: NORMAL

## 2024-05-17 ENCOUNTER — OUTPATIENT (OUTPATIENT)
Dept: OUTPATIENT SERVICES | Facility: HOSPITAL | Age: 49
LOS: 1 days | End: 2024-05-17
Payer: MEDICAID

## 2024-05-17 ENCOUNTER — APPOINTMENT (OUTPATIENT)
Dept: MRI IMAGING | Facility: HOSPITAL | Age: 49
End: 2024-05-17
Payer: MEDICAID

## 2024-05-17 DIAGNOSIS — Z98.890 OTHER SPECIFIED POSTPROCEDURAL STATES: Chronic | ICD-10-CM

## 2024-05-17 DIAGNOSIS — Z41.9 ENCOUNTER FOR PROCEDURE FOR PURPOSES OTHER THAN REMEDYING HEALTH STATE, UNSPECIFIED: Chronic | ICD-10-CM

## 2024-05-17 DIAGNOSIS — M25.561 PAIN IN RIGHT KNEE: ICD-10-CM

## 2024-05-17 PROCEDURE — 73721 MRI JNT OF LWR EXTRE W/O DYE: CPT

## 2024-05-17 PROCEDURE — 73721 MRI JNT OF LWR EXTRE W/O DYE: CPT | Mod: 26,RT

## 2024-05-28 ENCOUNTER — APPOINTMENT (OUTPATIENT)
Dept: OTOLARYNGOLOGY | Facility: CLINIC | Age: 49
End: 2024-05-28
Payer: MEDICAID

## 2024-05-28 DIAGNOSIS — H90.3 SENSORINEURAL HEARING LOSS, BILATERAL: ICD-10-CM

## 2024-05-28 DIAGNOSIS — H91.91 UNSPECIFIED HEARING LOSS, RIGHT EAR: ICD-10-CM

## 2024-05-28 DIAGNOSIS — H91.8X3 OTHER SPECIFIED HEARING LOSS, BILATERAL: ICD-10-CM

## 2024-05-28 PROCEDURE — 99214 OFFICE O/P EST MOD 30 MIN: CPT

## 2024-05-28 PROCEDURE — 92625 TINNITUS ASSESSMENT: CPT

## 2024-05-28 NOTE — PHYSICAL EXAM
[Midline] : trachea located in midline position [Binocular Microscopic Exam] : Binocular microscopic exam was performed [Hearing Albarran Test (Tuning Fork On Forehead)] : no lateralization of tone [Normal] : gait was normal [Hearing Loss Right Only] : normal [Hearing Loss Left Only] : normal [Rinne Test Air Conduction Persists > Bone Conduction Right] : bone conduction greater than air conduction on the right [Rinne Test Air Conduction Persists > Bone Conduction Left] : bone conduction greater than air conduction on the left [Nystagmus] : ~T no ~M nystagmus was seen [Fukuda Step Test] : Fukuda Step Test was Negative [Romberg's Sign] : Romberg's sign was absent [Fistula Sign] : Fistula Sign: Negative [Past-Pointing] : Past-Pointing: Negative [Karthikeyan-Hallraffaeleke] : [place]-Hallpike: Negative

## 2024-05-28 NOTE — DATA REVIEWED
[de-identified] : MRI-BRAIN AND IAC'S PRE AND POST IV CONTRAST 08/25/2023 IMPRESSION: Normal appearance of the internal auditory canals and inner ear structures. [de-identified] : An audiogram was ordered and performed including tympanometry, pure tones and speech, for patient's complaint of hearing loss I have independently reviewed the patient's audiogram from today and my findings include colette SNHL, R ear profound, L ear mild and stable

## 2024-05-28 NOTE — CONSULT LETTER
[Dear  ___] : Dear  [unfilled], [Consult Letter:] : I had the pleasure of evaluating your patient, [unfilled]. [Please see my note below.] : Please see my note below. [Consult Closing:] : Thank you very much for allowing me to participate in the care of this patient.  If you have any questions, please do not hesitate to contact me. [Sincerely,] : Sincerely, [FreeTextEntry2] : Dr Leny Garza [FreeTextEntry3] : Shelley Chen MD, Otology Neurotology & Skull Base Surgery

## 2024-05-28 NOTE — HISTORY OF PRESENT ILLNESS
[Tinnitus] : tinnitus [Headache] : headache [Hearing Loss] : hearing loss [Lightheadedness] : lightheadedness [de-identified] : 49 year man who presents for follow-up of  colette SNHL and tinnitus. Patient reports that he got the left hearing aid and is waiting on insurance approval for right hearing aid. Reports no issues with the hearing aid, states his hearing has improved since then.  Reports intermittent tinnitus, but when he has the hearing aid in it muffles the tinnitus. Patient denies otalgia, otorrhea, ear infections, changes in hearing, dizziness, vertigo, headaches related to hearing.

## 2024-05-28 NOTE — PROCEDURE
[Other ___] : [unfilled] [Same] : same as the Pre Op Dx. [] : Binocular Microscopy [FreeTextEntry4] : none  [FreeTextEntry6] : Operative microscope was used to examine the ear canal, ear drum and visible middle ear landmarks. Adequate exam would not have been possible without the use of a microscope. Findings are described.

## 2024-06-11 ENCOUNTER — APPOINTMENT (OUTPATIENT)
Dept: ORTHOPEDIC SURGERY | Facility: CLINIC | Age: 49
End: 2024-06-11
Payer: MEDICAID

## 2024-06-11 DIAGNOSIS — M22.2X1 PATELLOFEMORAL DISORDERS, RIGHT KNEE: ICD-10-CM

## 2024-06-11 DIAGNOSIS — M25.851 OTHER SPECIFIED JOINT DISORDERS, RIGHT HIP: ICD-10-CM

## 2024-06-11 PROCEDURE — 99214 OFFICE O/P EST MOD 30 MIN: CPT

## 2024-06-11 NOTE — PHYSICAL EXAM
[de-identified] : General: Well appearing, no acute distress Neurologic: A&Ox3, No focal deficits Head: NCAT without abrasions, lacerations, or ecchymosis to head, face, or scalp Eyes: No scleral icterus, no gross abnormalities Respiratory: Equal chest wall expansion bilaterally, no accessory muscle use Lymphatic: No lymphadenopathy palpated Skin: Warm and dry Psychiatric: Normal mood and affect   Examination of the Right hip reveals no obvious deformity or leg length discrepancy. There is no swelling noted. The patient is nontender to palpation over the greater trochanter, groin, and IT band. The patients range of motion is to 110 degrees of hip flexion, 40 degrees of abduction, 20 degrees of adduction, 40 degrees of internal rotation and 45 degrees of external rotation. The patient has 4/5 strength to resisted hip flexion, 5/5 abduction and 5/5 adduction. The patient has a positive EVA to the SI joint and negative FADIR Test. Negative Breaux Test. Negative resisted SLR test at 30 degrees of hip flexion ([facility]). Negative Piriformis Test. No SI joint instability. There is no pain with logrolling. The calf and thigh are soft and nontender bilaterally. The patient is grossly neurovascularly intact distally.   Examination of the right knee reveals no significant effusion, erythema, or ecchymosis. There are no leg length discrepancies appreciated. The patient's ROM is from 0-130 degrees. Normal strength with knee flexion. POS crepitus at the patella. The knee is stable to Lachman testing, as well as anterior and posterior drawer. There is no valgus or varus instability. The calf and thigh are soft, supple, and nontender. The patient is grossly neurovascularly intact distally.  [de-identified] : The following radiographs were ordered at an outside facility and read by me during this patient's visit. I reviewed each radiograph in detail with the patient and discussed the findings as highlighted below. 3 views of the right hip show no fracture, dislocation. There is no degenerative change seen. There is no malalignment. No obvious osseous abnormality. Otherwise unremarkable.

## 2024-06-11 NOTE — DISCUSSION/SUMMARY
[de-identified] : We had a thorough discussion regarding the nature of his pain, the pathophysiology, as well as all treatment options. Based on clinical exam and radiograph findings they have pain referred from the SI joint. I recommend the patient f/u with pain management and neurological specialists to further discuss his back pain. All questions were answered and the patient verbalized understanding. The patient is in agreement with this treatment plan.

## 2024-06-11 NOTE — HISTORY OF PRESENT ILLNESS
[de-identified] : NEELA is a 49 year male here today for right knee and hip pain. He reports constant pain in the knee as well as numbness around groin area. He states that his pain radiates into his foot. He reports a random incident of foot pain that felt like "razor blades" went in it. He has been seeing different orthopedics for his back. He has had multiple falls onto his tail bone. He has rods in spine from prior surgery.

## 2024-06-11 NOTE — ADDENDUM
[FreeTextEntry1] : Documented by Shira Canada acting as a scribe for Dr. Mendoza on 06/11/2024. All medical record entries made by the Scribe were at my, Dr. Mendoza's, direction and personally dictated by me on 06/11/2024. I have reviewed the chart and agree that the record accurately reflects my personal performance of the history, physical exam, procedure and imaging.

## 2024-06-11 NOTE — CONSULT LETTER
[Dear  ___] : Dear  [unfilled], [Consult Letter:] : I had the pleasure of evaluating your patient, [unfilled]. [Please see my note below.] : Please see my note below. [Sincerely,] : Sincerely, [FreeTextEntry3] : Dr. Darin Mendoza

## 2024-08-05 ENCOUNTER — APPOINTMENT (OUTPATIENT)
Dept: RHEUMATOLOGY | Facility: CLINIC | Age: 49
End: 2024-08-05

## 2024-08-05 PROCEDURE — G2211 COMPLEX E/M VISIT ADD ON: CPT | Mod: NC,1L

## 2024-08-05 PROCEDURE — 99214 OFFICE O/P EST MOD 30 MIN: CPT

## 2024-08-05 NOTE — HISTORY OF PRESENT ILLNESS
[___ Month(s) Ago] : [unfilled] month(s) ago [FreeTextEntry1] : 8/2024 followup: Patient's pain is mainly in his cervical, thoracic and lumbar spine, as well as right lower extremity including the right knee.  He does not have other peripheral joint pains at this time.  No joint swelling.  He saw orthopedic surgery and was referred to pain management which she will schedule.  He has not had epidural injections previously. No significant dry eyes or dry mouth, but he states he is currently getting eyedrops by his ophthalmologist.

## 2024-08-05 NOTE — ASSESSMENT
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 Since then, Sjogren's antibodies have been negative, most recently in May 2024.  Chronic pains likely are in setting of multiple prior injuries and hx of spinal surgery- a lot of his tenderness is over muscle rather than joint. Regardless, trial of HCQ was started months ago in setting of elevated RF and SSB antibody. Patient reports no significant relief with this medication taken for at least 3 months. Now off of HCQ.  CT chest 1/2024 did not show hilar or other lymphadenopathy so suspicion for sarcoidosis is low at this time. This was checked due to elevated serum ACE level after prior visit, and hx of optic neuritis for this patient.  Plan: Repeat labs today, mainly ESR which has been slightly high for pt. At this time we will not prescribe additional DMARD therapy, as I suspect the majority of his pain stems from spinal structural issues including herniated disks and spinal stenosis, as well as patellofemoral syndrome; MRI of the R knee did not show synovitis.  Patient advised to see pain management specialist.  PT for RLE/R knee was ordered today.   Patient may follow up in 6 months, sooner if develops additional peripheral joint pain.

## 2024-08-05 NOTE — PHYSICAL EXAM
[General Appearance - Alert] : alert [General Appearance - In No Acute Distress] : in no acute distress [General Appearance - Well Developed] : well developed [Sclera] : the sclera and conjunctiva were normal [Extraocular Movements] : extraocular movements were intact [Outer Ear] : the ears and nose were normal in appearance [Neck Appearance] : the appearance of the neck was normal [Exaggerated Use Of Accessory Muscles For Inspiration] : no accessory muscle use [Edema] : there was no peripheral edema [Musculoskeletal - Swelling] : no joint swelling seen [FreeTextEntry1] : joints nontender, no synovitis noted [Skin Color & Pigmentation] : normal skin color and pigmentation [] : no rash [Skin Lesions] : no skin lesions [No Focal Deficits] : no focal deficits [Oriented To Time, Place, And Person] : oriented to person, place, and time [Affect] : the affect was normal [Mood] : the mood was normal

## 2024-08-05 NOTE — REVIEW OF SYSTEMS
[As Noted in HPI] : as noted in HPI [Joint Pain] : joint pain [Joint Swelling] : no joint swelling [Negative] : Genitourinary [FreeTextEntry9] : back pain and RLE pain including R. knee

## 2024-08-05 NOTE — DATA REVIEWED
[FreeTextEntry1] : Sjogren's antibodies negative in May 2024. Serum angiotensin-converting enzyme level normal in May 2024. Rheumatoid factor 17 in May 2024

## 2024-08-09 ENCOUNTER — NON-APPOINTMENT (OUTPATIENT)
Age: 49
End: 2024-08-09

## 2024-08-22 ENCOUNTER — NON-APPOINTMENT (OUTPATIENT)
Age: 49
End: 2024-08-22

## 2024-09-06 ENCOUNTER — APPOINTMENT (OUTPATIENT)
Dept: FAMILY MEDICINE | Facility: HOSPITAL | Age: 49
End: 2024-09-06

## 2024-09-06 ENCOUNTER — OUTPATIENT (OUTPATIENT)
Dept: OUTPATIENT SERVICES | Facility: HOSPITAL | Age: 49
LOS: 1 days | End: 2024-09-06
Payer: MEDICAID

## 2024-09-06 VITALS
OXYGEN SATURATION: 96 % | RESPIRATION RATE: 16 BRPM | SYSTOLIC BLOOD PRESSURE: 151 MMHG | TEMPERATURE: 97.4 F | HEART RATE: 96 BPM | WEIGHT: 212 LBS | BODY MASS INDEX: 31.31 KG/M2 | DIASTOLIC BLOOD PRESSURE: 83 MMHG

## 2024-09-06 DIAGNOSIS — M51.36 OTHER INTERVERTEBRAL DISC DEGENERATION, LUMBAR REGION: ICD-10-CM

## 2024-09-06 DIAGNOSIS — Z98.890 OTHER SPECIFIED POSTPROCEDURAL STATES: Chronic | ICD-10-CM

## 2024-09-06 DIAGNOSIS — Z41.9 ENCOUNTER FOR PROCEDURE FOR PURPOSES OTHER THAN REMEDYING HEALTH STATE, UNSPECIFIED: Chronic | ICD-10-CM

## 2024-09-06 DIAGNOSIS — M54.6 PAIN IN THORACIC SPINE: ICD-10-CM

## 2024-09-06 DIAGNOSIS — H90.3 SENSORINEURAL HEARING LOSS, BILATERAL: ICD-10-CM

## 2024-09-06 DIAGNOSIS — F32.A DEPRESSION, UNSPECIFIED: ICD-10-CM

## 2024-09-06 DIAGNOSIS — Z00.00 ENCOUNTER FOR GENERAL ADULT MEDICAL EXAMINATION WITHOUT ABNORMAL FINDINGS: ICD-10-CM

## 2024-09-06 DIAGNOSIS — M25.50 PAIN IN UNSPECIFIED JOINT: ICD-10-CM

## 2024-09-06 DIAGNOSIS — M54.2 CERVICALGIA: ICD-10-CM

## 2024-09-06 PROCEDURE — G0463: CPT

## 2024-09-06 NOTE — HEALTH RISK ASSESSMENT
[No] : No [Yes] : In the past 12 months have you used drugs other than those required for medical reasons? Yes [One fall no injury in past year] : Patient reported one fall in the past year without injury [Never] : Never [Fully functional (bathing, dressing, toileting, transferring, walking, feeding)] : Fully functional (bathing, dressing, toileting, transferring, walking, feeding) [Fully functional (using the telephone, shopping, preparing meals, housekeeping, doing laundry, using] : Fully functional and needs no help or supervision to perform IADLs (using the telephone, shopping, preparing meals, housekeeping, doing laundry, using transportation, managing medications and managing finances) [de-identified] : marijuana daily [de-identified] : with father and 2 sisters

## 2024-09-06 NOTE — HEALTH RISK ASSESSMENT
[No] : No [Yes] : In the past 12 months have you used drugs other than those required for medical reasons? Yes [One fall no injury in past year] : Patient reported one fall in the past year without injury [Never] : Never [Fully functional (bathing, dressing, toileting, transferring, walking, feeding)] : Fully functional (bathing, dressing, toileting, transferring, walking, feeding) [Fully functional (using the telephone, shopping, preparing meals, housekeeping, doing laundry, using] : Fully functional and needs no help or supervision to perform IADLs (using the telephone, shopping, preparing meals, housekeeping, doing laundry, using transportation, managing medications and managing finances) [de-identified] : marijuana daily [de-identified] : with father and 2 sisters

## 2024-09-06 NOTE — PHYSICAL EXAM
[No Acute Distress] : no acute distress [Well-Appearing] : well-appearing [Normal Sclera/Conjunctiva] : normal sclera/conjunctiva [PERRL] : pupils equal round and reactive to light [EOMI] : extraocular movements intact [Normal Oropharynx] : the oropharynx was normal [Supple] : supple [Normal] : normal rate, regular rhythm, normal S1 and S2 and no murmur heard [No Edema] : there was no peripheral edema [No Extremity Clubbing/Cyanosis] : no extremity clubbing/cyanosis [Scoliosis] : scoliosis [Normal Affect] : the affect was normal [Normal Insight/Judgement] : insight and judgment were intact [de-identified] : uses cane to walk [de-identified] : walks with cane

## 2024-09-06 NOTE — HISTORY OF PRESENT ILLNESS
[FreeTextEntry1] : physical [de-identified] : 49M h/o HLD, depression, chronic pain of multiple sites, spinal surgery in 1997, seborrheic dermatitis on ketoconazole shampoo, right hearing loss, left tinnitus, right eye optic neuritis, small hiatal hernia, chronic gastritis presents for physical. pt was recently notified of MRI cervical and lumbar spine ordered by neurology revealing disc bulging and severe dextroscoliosis. pt was advised to see pain management specialist and physical therapy. pt takes cyclobenzaprine for pain PRN and requests refill.

## 2024-09-06 NOTE — PHYSICAL EXAM
[No Acute Distress] : no acute distress [Well-Appearing] : well-appearing [Normal Sclera/Conjunctiva] : normal sclera/conjunctiva [PERRL] : pupils equal round and reactive to light [EOMI] : extraocular movements intact [Normal Oropharynx] : the oropharynx was normal [Supple] : supple [Normal] : normal rate, regular rhythm, normal S1 and S2 and no murmur heard [No Edema] : there was no peripheral edema [No Extremity Clubbing/Cyanosis] : no extremity clubbing/cyanosis [Scoliosis] : scoliosis [Normal Affect] : the affect was normal [Normal Insight/Judgement] : insight and judgment were intact [de-identified] : uses cane to walk [de-identified] : walks with cane

## 2024-09-06 NOTE — HISTORY OF PRESENT ILLNESS
[FreeTextEntry1] : physical [de-identified] : 49M h/o HLD, depression, chronic pain of multiple sites, spinal surgery in 1997, seborrheic dermatitis on ketoconazole shampoo, right hearing loss, left tinnitus, right eye optic neuritis, small hiatal hernia, chronic gastritis presents for physical. pt was recently notified of MRI cervical and lumbar spine ordered by neurology revealing disc bulging and severe dextroscoliosis. pt was advised to see pain management specialist and physical therapy. pt takes cyclobenzaprine for pain PRN and requests refill.

## 2024-09-12 ENCOUNTER — APPOINTMENT (OUTPATIENT)
Dept: DERMATOLOGY | Facility: CLINIC | Age: 49
End: 2024-09-12
Payer: MEDICAID

## 2024-09-12 VITALS — BODY MASS INDEX: 30.06 KG/M2 | HEIGHT: 70 IN | WEIGHT: 210 LBS

## 2024-09-12 DIAGNOSIS — L21.9 SEBORRHEIC DERMATITIS, UNSPECIFIED: ICD-10-CM

## 2024-09-12 DIAGNOSIS — L70.0 ACNE VULGARIS: ICD-10-CM

## 2024-09-12 PROCEDURE — 99214 OFFICE O/P EST MOD 30 MIN: CPT

## 2024-09-12 RX ORDER — CLINDAMYCIN PHOSPHATE 1 G/10ML
1 GEL TOPICAL TWICE DAILY
Qty: 1 | Refills: 2 | Status: ACTIVE | COMMUNITY
Start: 2024-09-12 | End: 1900-01-01

## 2024-09-12 NOTE — PHYSICAL EXAM
[Alert] : alert [Oriented x 3] : ~L oriented x 3 [Well Nourished] : well nourished [Conjunctiva Non-injected] : conjunctiva non-injected [No Visual Lymphadenopathy] : no visual  lymphadenopathy [No Clubbing] : no clubbing [No Edema] : no edema [No Bromhidrosis] : no bromhidrosis [No Chromhidrosis] : no chromhidrosis [FreeTextEntry3] : Greasy scale in the eyebrows and chest - improved R post auricular region with yellow erythematous papule

## 2024-09-12 NOTE — HISTORY OF PRESENT ILLNESS
[FreeTextEntry1] : f/u seb derm [de-identified] : 49M here in f/u of seb derm. Improved with topicals. Happy with control Also notes a bump behind the R ear.

## 2024-09-29 ENCOUNTER — NON-APPOINTMENT (OUTPATIENT)
Age: 49
End: 2024-09-29

## 2024-10-01 ENCOUNTER — APPOINTMENT (OUTPATIENT)
Dept: NEUROLOGY | Facility: CLINIC | Age: 49
End: 2024-10-01

## 2024-10-04 ENCOUNTER — APPOINTMENT (OUTPATIENT)
Dept: GASTROENTEROLOGY | Facility: CLINIC | Age: 49
End: 2024-10-04

## 2024-10-22 PROBLEM — M96.1: Status: ACTIVE | Noted: 2024-10-22

## 2024-10-22 PROBLEM — M96.1 POSTLAMINECTOMY SYNDROME, LUMBAR: Status: ACTIVE | Noted: 2024-10-22

## 2024-10-23 ENCOUNTER — APPOINTMENT (OUTPATIENT)
Dept: PAIN MANAGEMENT | Facility: CLINIC | Age: 49
End: 2024-10-23
Payer: MEDICAID

## 2024-10-23 VITALS — HEIGHT: 70 IN | WEIGHT: 220 LBS | BODY MASS INDEX: 31.5 KG/M2

## 2024-10-23 DIAGNOSIS — M48.07 SPINAL STENOSIS, LUMBOSACRAL REGION: ICD-10-CM

## 2024-10-23 DIAGNOSIS — M96.1 POSTLAMINECTOMY SYNDROME, NOT ELSEWHERE CLASSIFIED: ICD-10-CM

## 2024-10-23 DIAGNOSIS — M54.16 RADICULOPATHY, LUMBAR REGION: ICD-10-CM

## 2024-10-23 DIAGNOSIS — M54.6 PAIN IN THORACIC SPINE: ICD-10-CM

## 2024-10-23 PROCEDURE — 99204 OFFICE O/P NEW MOD 45 MIN: CPT

## 2024-11-08 ENCOUNTER — RESULT REVIEW (OUTPATIENT)
Age: 49
End: 2024-11-08

## 2024-12-20 ENCOUNTER — OUTPATIENT (OUTPATIENT)
Dept: OUTPATIENT SERVICES | Facility: HOSPITAL | Age: 49
LOS: 1 days | End: 2024-12-20
Payer: MEDICAID

## 2024-12-20 ENCOUNTER — APPOINTMENT (OUTPATIENT)
Dept: FAMILY MEDICINE | Facility: HOSPITAL | Age: 49
End: 2024-12-20

## 2024-12-20 VITALS
TEMPERATURE: 97.6 F | OXYGEN SATURATION: 96 % | HEART RATE: 92 BPM | SYSTOLIC BLOOD PRESSURE: 121 MMHG | DIASTOLIC BLOOD PRESSURE: 80 MMHG | WEIGHT: 219 LBS | RESPIRATION RATE: 16 BRPM | BODY MASS INDEX: 31.42 KG/M2

## 2024-12-20 DIAGNOSIS — M48.07 SPINAL STENOSIS, LUMBOSACRAL REGION: ICD-10-CM

## 2024-12-20 DIAGNOSIS — Z41.9 ENCOUNTER FOR PROCEDURE FOR PURPOSES OTHER THAN REMEDYING HEALTH STATE, UNSPECIFIED: Chronic | ICD-10-CM

## 2024-12-20 DIAGNOSIS — Z98.890 OTHER SPECIFIED POSTPROCEDURAL STATES: Chronic | ICD-10-CM

## 2024-12-20 DIAGNOSIS — Z02.89 ENCOUNTER FOR OTHER ADMINISTRATIVE EXAMINATIONS: ICD-10-CM

## 2024-12-20 DIAGNOSIS — Z00.00 ENCOUNTER FOR GENERAL ADULT MEDICAL EXAMINATION WITHOUT ABNORMAL FINDINGS: ICD-10-CM

## 2024-12-20 PROCEDURE — G0463: CPT

## 2025-01-14 PROBLEM — M51.369 DISC DEGENERATION, LUMBAR: Status: ACTIVE | Noted: 2024-03-07

## 2025-01-14 PROBLEM — M47.816 LUMBAR SPONDYLOSIS: Status: ACTIVE | Noted: 2025-01-14

## 2025-01-15 ENCOUNTER — APPOINTMENT (OUTPATIENT)
Dept: PAIN MANAGEMENT | Facility: CLINIC | Age: 50
End: 2025-01-15
Payer: MEDICAID

## 2025-01-15 VITALS — HEIGHT: 70 IN | BODY MASS INDEX: 31.64 KG/M2 | WEIGHT: 221 LBS

## 2025-01-15 DIAGNOSIS — M47.816 SPONDYLOSIS W/OUT MYELOPATHY OR RADICULOPATHY, LUMBAR REGION: ICD-10-CM

## 2025-01-15 DIAGNOSIS — M54.16 RADICULOPATHY, LUMBAR REGION: ICD-10-CM

## 2025-01-15 DIAGNOSIS — M41.9 SCOLIOSIS, UNSPECIFIED: ICD-10-CM

## 2025-01-15 DIAGNOSIS — M51.369: ICD-10-CM

## 2025-01-15 DIAGNOSIS — M48.07 SPINAL STENOSIS, LUMBOSACRAL REGION: ICD-10-CM

## 2025-01-15 DIAGNOSIS — M96.1 POSTLAMINECTOMY SYNDROME, NOT ELSEWHERE CLASSIFIED: ICD-10-CM

## 2025-01-15 DIAGNOSIS — M54.12 RADICULOPATHY, CERVICAL REGION: ICD-10-CM

## 2025-01-15 PROCEDURE — 99214 OFFICE O/P EST MOD 30 MIN: CPT

## 2025-01-31 ENCOUNTER — APPOINTMENT (OUTPATIENT)
Dept: PAIN MANAGEMENT | Facility: CLINIC | Age: 50
End: 2025-01-31
Payer: MEDICAID

## 2025-01-31 DIAGNOSIS — Z98.1 ARTHRODESIS STATUS: ICD-10-CM

## 2025-01-31 DIAGNOSIS — M50.20 OTHER CERVICAL DISC DISPLACEMENT, UNSPECIFIED CERVICAL REGION: ICD-10-CM

## 2025-01-31 DIAGNOSIS — M54.12 RADICULOPATHY, CERVICAL REGION: ICD-10-CM

## 2025-01-31 PROCEDURE — 62321 NJX INTERLAMINAR CRV/THRC: CPT

## 2025-02-12 ENCOUNTER — APPOINTMENT (OUTPATIENT)
Dept: PAIN MANAGEMENT | Facility: CLINIC | Age: 50
End: 2025-02-12
Payer: MEDICAID

## 2025-02-12 VITALS — HEIGHT: 70 IN | BODY MASS INDEX: 32.07 KG/M2 | WEIGHT: 224 LBS

## 2025-02-12 DIAGNOSIS — M96.1 POSTLAMINECTOMY SYNDROME, NOT ELSEWHERE CLASSIFIED: ICD-10-CM

## 2025-02-12 DIAGNOSIS — M79.10 MYALGIA, UNSPECIFIED SITE: ICD-10-CM

## 2025-02-12 DIAGNOSIS — M48.07 SPINAL STENOSIS, LUMBOSACRAL REGION: ICD-10-CM

## 2025-02-12 DIAGNOSIS — M50.20 OTHER CERVICAL DISC DISPLACEMENT, UNSPECIFIED CERVICAL REGION: ICD-10-CM

## 2025-02-12 DIAGNOSIS — M47.816 SPONDYLOSIS W/OUT MYELOPATHY OR RADICULOPATHY, LUMBAR REGION: ICD-10-CM

## 2025-02-12 DIAGNOSIS — M54.16 RADICULOPATHY, LUMBAR REGION: ICD-10-CM

## 2025-02-12 PROCEDURE — 20552 NJX 1/MLT TRIGGER POINT 1/2: CPT

## 2025-02-12 PROCEDURE — 99214 OFFICE O/P EST MOD 30 MIN: CPT | Mod: 25

## 2025-02-18 ENCOUNTER — APPOINTMENT (OUTPATIENT)
Dept: RHEUMATOLOGY | Facility: CLINIC | Age: 50
End: 2025-02-18
Payer: MEDICAID

## 2025-02-18 VITALS
TEMPERATURE: 97.6 F | OXYGEN SATURATION: 98 % | HEART RATE: 88 BPM | HEIGHT: 70 IN | SYSTOLIC BLOOD PRESSURE: 128 MMHG | WEIGHT: 240 LBS | DIASTOLIC BLOOD PRESSURE: 76 MMHG | BODY MASS INDEX: 34.36 KG/M2

## 2025-02-18 DIAGNOSIS — R76.8 OTHER SPECIFIED ABNORMAL IMMUNOLOGICAL FINDINGS IN SERUM: ICD-10-CM

## 2025-02-18 DIAGNOSIS — M25.50 PAIN IN UNSPECIFIED JOINT: ICD-10-CM

## 2025-02-18 PROCEDURE — 99214 OFFICE O/P EST MOD 30 MIN: CPT

## 2025-02-18 PROCEDURE — G2211 COMPLEX E/M VISIT ADD ON: CPT | Mod: NC

## 2025-02-21 LAB
25(OH)D3 SERPL-MCNC: 24.2 NG/ML
ALBUMIN SERPL ELPH-MCNC: 4.4 G/DL
ALP BLD-CCNC: 90 U/L
ALT SERPL-CCNC: 45 U/L
ANION GAP SERPL CALC-SCNC: 11 MMOL/L
AST SERPL-CCNC: 29 U/L
BASOPHILS # BLD AUTO: 0.06 K/UL
BASOPHILS NFR BLD AUTO: 0.5 %
BILIRUB SERPL-MCNC: 0.7 MG/DL
BUN SERPL-MCNC: 17 MG/DL
CALCIUM SERPL-MCNC: 9.7 MG/DL
CCP AB SER IA-ACNC: <8 U/ML
CHLORIDE SERPL-SCNC: 98 MMOL/L
CO2 SERPL-SCNC: 30 MMOL/L
CREAT SERPL-MCNC: 0.97 MG/DL
CRP SERPL-MCNC: 3 MG/L
EGFR: 96 ML/MIN/1.73M2
ENA SS-A AB SER IA-ACNC: 0.2 AL
ENA SS-B AB SER IA-ACNC: 0.6 AL
EOSINOPHIL # BLD AUTO: 0.04 K/UL
EOSINOPHIL NFR BLD AUTO: 0.3 %
ERYTHROCYTE [SEDIMENTATION RATE] IN BLOOD BY WESTERGREN METHOD: 15 MM/HR
GLUCOSE SERPL-MCNC: 92 MG/DL
HCT VFR BLD CALC: 50.4 %
HGB BLD-MCNC: 17.7 G/DL
IMM GRANULOCYTES NFR BLD AUTO: 0.5 %
LYMPHOCYTES # BLD AUTO: 2.23 K/UL
LYMPHOCYTES NFR BLD AUTO: 18 %
MAN DIFF?: NORMAL
MCHC RBC-ENTMCNC: 30.4 PG
MCHC RBC-ENTMCNC: 35.1 G/DL
MCV RBC AUTO: 86.4 FL
MONOCYTES # BLD AUTO: 0.71 K/UL
MONOCYTES NFR BLD AUTO: 5.7 %
NEUTROPHILS # BLD AUTO: 9.28 K/UL
NEUTROPHILS NFR BLD AUTO: 75 %
PLATELET # BLD AUTO: 241 K/UL
POTASSIUM SERPL-SCNC: 4.2 MMOL/L
PROT SERPL-MCNC: 7.8 G/DL
RBC # BLD: 5.83 M/UL
RBC # FLD: 12.5 %
RF+CCP IGG SER-IMP: NEGATIVE
RHEUMATOID FACT SER QL: 24 IU/ML
SODIUM SERPL-SCNC: 139 MMOL/L
WBC # FLD AUTO: 12.38 K/UL

## 2025-03-20 ENCOUNTER — APPOINTMENT (OUTPATIENT)
Dept: DERMATOLOGY | Facility: CLINIC | Age: 50
End: 2025-03-20
Payer: MEDICAID

## 2025-03-20 DIAGNOSIS — R23.8 OTHER SKIN CHANGES: ICD-10-CM

## 2025-03-20 DIAGNOSIS — L21.9 SEBORRHEIC DERMATITIS, UNSPECIFIED: ICD-10-CM

## 2025-03-20 PROCEDURE — 99213 OFFICE O/P EST LOW 20 MIN: CPT

## 2025-04-15 ENCOUNTER — APPOINTMENT (OUTPATIENT)
Facility: CLINIC | Age: 50
End: 2025-04-15
Payer: MEDICAID

## 2025-04-15 VITALS
WEIGHT: 210 LBS | HEIGHT: 70 IN | OXYGEN SATURATION: 96 % | RESPIRATION RATE: 16 BRPM | BODY MASS INDEX: 30.06 KG/M2 | SYSTOLIC BLOOD PRESSURE: 130 MMHG | TEMPERATURE: 97.2 F | DIASTOLIC BLOOD PRESSURE: 74 MMHG | HEART RATE: 80 BPM

## 2025-04-15 PROCEDURE — 99214 OFFICE O/P EST MOD 30 MIN: CPT

## 2025-04-16 ENCOUNTER — APPOINTMENT (OUTPATIENT)
Dept: PAIN MANAGEMENT | Facility: CLINIC | Age: 50
End: 2025-04-16
Payer: MEDICAID

## 2025-04-16 VITALS — HEIGHT: 70 IN | WEIGHT: 219 LBS | BODY MASS INDEX: 31.35 KG/M2

## 2025-04-16 DIAGNOSIS — M96.1 POSTLAMINECTOMY SYNDROME, NOT ELSEWHERE CLASSIFIED: ICD-10-CM

## 2025-04-16 DIAGNOSIS — M50.20 OTHER CERVICAL DISC DISPLACEMENT, UNSPECIFIED CERVICAL REGION: ICD-10-CM

## 2025-04-16 DIAGNOSIS — M79.10 MYALGIA, UNSPECIFIED SITE: ICD-10-CM

## 2025-04-16 DIAGNOSIS — M47.816 SPONDYLOSIS W/OUT MYELOPATHY OR RADICULOPATHY, LUMBAR REGION: ICD-10-CM

## 2025-04-16 DIAGNOSIS — M54.12 RADICULOPATHY, CERVICAL REGION: ICD-10-CM

## 2025-04-16 DIAGNOSIS — M54.16 RADICULOPATHY, LUMBAR REGION: ICD-10-CM

## 2025-04-16 PROCEDURE — 20552 NJX 1/MLT TRIGGER POINT 1/2: CPT

## 2025-04-16 PROCEDURE — 99214 OFFICE O/P EST MOD 30 MIN: CPT | Mod: 25

## 2025-05-21 ENCOUNTER — APPOINTMENT (OUTPATIENT)
Dept: PAIN MANAGEMENT | Facility: CLINIC | Age: 50
End: 2025-05-21
Payer: MEDICAID

## 2025-05-21 DIAGNOSIS — M48.07 SPINAL STENOSIS, LUMBOSACRAL REGION: ICD-10-CM

## 2025-05-21 DIAGNOSIS — M54.16 RADICULOPATHY, LUMBAR REGION: ICD-10-CM

## 2025-05-21 PROCEDURE — 62323 NJX INTERLAMINAR LMBR/SAC: CPT

## 2025-06-04 ENCOUNTER — APPOINTMENT (OUTPATIENT)
Dept: PAIN MANAGEMENT | Facility: CLINIC | Age: 50
End: 2025-06-04
Payer: MEDICAID

## 2025-06-04 VITALS — HEIGHT: 70 IN | WEIGHT: 211 LBS | BODY MASS INDEX: 30.21 KG/M2

## 2025-06-04 DIAGNOSIS — M79.10 MYALGIA, UNSPECIFIED SITE: ICD-10-CM

## 2025-06-04 DIAGNOSIS — M54.12 RADICULOPATHY, CERVICAL REGION: ICD-10-CM

## 2025-06-04 DIAGNOSIS — Z98.1 ARTHRODESIS STATUS: ICD-10-CM

## 2025-06-04 DIAGNOSIS — M41.9 SCOLIOSIS, UNSPECIFIED: ICD-10-CM

## 2025-06-04 DIAGNOSIS — M50.20 OTHER CERVICAL DISC DISPLACEMENT, UNSPECIFIED CERVICAL REGION: ICD-10-CM

## 2025-06-04 DIAGNOSIS — M47.816 SPONDYLOSIS W/OUT MYELOPATHY OR RADICULOPATHY, LUMBAR REGION: ICD-10-CM

## 2025-06-04 DIAGNOSIS — M96.1 POSTLAMINECTOMY SYNDROME, NOT ELSEWHERE CLASSIFIED: ICD-10-CM

## 2025-06-04 DIAGNOSIS — M54.16 RADICULOPATHY, LUMBAR REGION: ICD-10-CM

## 2025-06-04 PROCEDURE — 99214 OFFICE O/P EST MOD 30 MIN: CPT

## 2025-06-10 ENCOUNTER — APPOINTMENT (OUTPATIENT)
Facility: CLINIC | Age: 50
End: 2025-06-10
Payer: MEDICAID

## 2025-06-10 VITALS
HEART RATE: 90 BPM | SYSTOLIC BLOOD PRESSURE: 130 MMHG | DIASTOLIC BLOOD PRESSURE: 88 MMHG | TEMPERATURE: 97.8 F | WEIGHT: 205 LBS | RESPIRATION RATE: 16 BRPM | OXYGEN SATURATION: 96 % | HEIGHT: 70 IN | BODY MASS INDEX: 29.35 KG/M2

## 2025-06-10 PROCEDURE — G2211 COMPLEX E/M VISIT ADD ON: CPT | Mod: NC

## 2025-06-10 PROCEDURE — 99213 OFFICE O/P EST LOW 20 MIN: CPT

## 2025-06-10 RX ORDER — OMEPRAZOLE 40 MG/1
40 CAPSULE, DELAYED RELEASE ORAL
Qty: 30 | Refills: 2 | Status: ACTIVE | COMMUNITY
Start: 2025-06-10 | End: 1900-01-01

## 2025-09-09 ENCOUNTER — APPOINTMENT (OUTPATIENT)
Facility: CLINIC | Age: 50
End: 2025-09-09
Payer: MEDICAID

## 2025-09-09 VITALS
OXYGEN SATURATION: 98 % | TEMPERATURE: 98 F | SYSTOLIC BLOOD PRESSURE: 110 MMHG | HEART RATE: 71 BPM | WEIGHT: 207 LBS | RESPIRATION RATE: 14 BRPM | DIASTOLIC BLOOD PRESSURE: 62 MMHG | BODY MASS INDEX: 29.63 KG/M2 | HEIGHT: 70 IN

## 2025-09-09 PROCEDURE — 99213 OFFICE O/P EST LOW 20 MIN: CPT

## 2025-09-09 PROCEDURE — G2211 COMPLEX E/M VISIT ADD ON: CPT | Mod: NC

## 2025-09-19 ENCOUNTER — APPOINTMENT (OUTPATIENT)
Dept: RHEUMATOLOGY | Facility: CLINIC | Age: 50
End: 2025-09-19
Payer: MEDICAID

## 2025-09-19 VITALS
SYSTOLIC BLOOD PRESSURE: 126 MMHG | WEIGHT: 207 LBS | HEART RATE: 97 BPM | DIASTOLIC BLOOD PRESSURE: 86 MMHG | HEIGHT: 70 IN | OXYGEN SATURATION: 98 % | TEMPERATURE: 98 F | BODY MASS INDEX: 29.63 KG/M2

## 2025-09-19 DIAGNOSIS — R76.8 OTHER SPECIFIED ABNORMAL IMMUNOLOGICAL FINDINGS IN SERUM: ICD-10-CM

## 2025-09-19 PROCEDURE — 99214 OFFICE O/P EST MOD 30 MIN: CPT

## 2025-09-19 PROCEDURE — G2211 COMPLEX E/M VISIT ADD ON: CPT | Mod: NC

## 2025-09-23 LAB
24R-OH-CALCIDIOL SERPL-MCNC: 43.7 PG/ML
25(OH)D3 SERPL-MCNC: 31.2 NG/ML
ACE BLD-CCNC: 86 U/L
ALBUMIN MFR SERPL ELPH: 59.8 %
ALBUMIN SERPL ELPH-MCNC: 4.6 G/DL
ALBUMIN SERPL-MCNC: 4.5 G/DL
ALBUMIN/GLOB SERPL: 1.5 RATIO
ALP BLD-CCNC: 79 U/L
ALPHA1 GLOB MFR SERPL ELPH: 3.7 %
ALPHA1 GLOB SERPL ELPH-MCNC: 0.3 G/DL
ALPHA2 GLOB MFR SERPL ELPH: 7.9 %
ALPHA2 GLOB SERPL ELPH-MCNC: 0.6 G/DL
ALT SERPL-CCNC: 48 U/L
ANION GAP SERPL CALC-SCNC: 13 MMOL/L
AST SERPL-CCNC: 44 U/L
B-GLOBULIN MFR SERPL ELPH: 12.3 %
B-GLOBULIN SERPL ELPH-MCNC: 0.9 G/DL
BASOPHILS # BLD AUTO: 0.05 K/UL
BASOPHILS NFR BLD AUTO: 0.7 %
BILIRUB SERPL-MCNC: 0.9 MG/DL
BUN SERPL-MCNC: 9 MG/DL
CALCIUM SERPL-MCNC: 9.4 MG/DL
CCP AB SER IA-ACNC: <8 U/ML
CCP AB SER QL: NEGATIVE
CHLORIDE SERPL-SCNC: 103 MMOL/L
CO2 SERPL-SCNC: 24 MMOL/L
CREAT SERPL-MCNC: 0.83 MG/DL
CRP SERPL-MCNC: 4 MG/L
EGFRCR SERPLBLD CKD-EPI 2021: 107 ML/MIN/1.73M2
ENA SS-A AB SER-ACNC: 0.3 AL
ENA SS-B AB SER-ACNC: 0.9 AL
EOSINOPHIL # BLD AUTO: 0.21 K/UL
EOSINOPHIL NFR BLD AUTO: 3.1 %
ERYTHROCYTE [SEDIMENTATION RATE] IN BLOOD BY WESTERGREN METHOD: 25 MM/HR
GAMMA GLOB FLD ELPH-MCNC: 1.2 G/DL
GAMMA GLOB MFR SERPL ELPH: 16.3 %
GLUCOSE SERPL-MCNC: 91 MG/DL
HCT VFR BLD CALC: 52.2 %
HGB BLD-MCNC: 17.6 G/DL
IMM GRANULOCYTES NFR BLD AUTO: 0.4 %
INTERPRETATION SERPL IEP-IMP: NORMAL
LYMPHOCYTES # BLD AUTO: 2.22 K/UL
LYMPHOCYTES NFR BLD AUTO: 33 %
MAN DIFF?: NORMAL
MCHC RBC-ENTMCNC: 30.6 PG
MCHC RBC-ENTMCNC: 33.7 G/DL
MCV RBC AUTO: 90.6 FL
MONOCYTES # BLD AUTO: 0.43 K/UL
MONOCYTES NFR BLD AUTO: 6.4 %
NEUTROPHILS # BLD AUTO: 3.79 K/UL
NEUTROPHILS NFR BLD AUTO: 56.4 %
PLATELET # BLD AUTO: 193 K/UL
POTASSIUM SERPL-SCNC: 4.2 MMOL/L
PROT SERPL-MCNC: 7.6 G/DL
RBC # BLD: 5.76 M/UL
RBC # FLD: 12.4 %
RHEUMATOID FACT SERPL-ACNC: 20 IU/ML
SODIUM SERPL-SCNC: 140 MMOL/L
TSH SERPL-ACNC: 1.45 UIU/ML
WBC # FLD AUTO: 6.73 K/UL

## 2025-09-24 LAB
ANCA AB SER-IMP: ABNORMAL
C-ANCA SER-ACNC: NEGATIVE
P-ANCA TITR SER IF: NEGATIVE

## (undated) DEVICE — STERIS DEFENDO 3-PIECE KIT (AIR/WATER, SUCTION & BIOPSY VALVES)

## (undated) DEVICE — Device

## (undated) DEVICE — SYR LUER SLIP TIP 30CC

## (undated) DEVICE — CATH IV SAFE BC 22G X 1" (BLUE)

## (undated) DEVICE — VALVE BIOPSY

## (undated) DEVICE — ELCTR GROUNDING PAD ADULT COVIDIEN

## (undated) DEVICE — FORCEP RADIAL JAW 4 W NDL 2.2MM 2.8MM 160CM YELLOW DISP

## (undated) DEVICE — DRAPE 3/4 SHEET W REINFORCEMENT 56X77"

## (undated) DEVICE — SUT POLYSORB 3-0 60" REEL

## (undated) DEVICE — RADIAL JAW 4 LG CAPACITY WITH NDL

## (undated) DEVICE — PLV-SCD MACHINE: Type: DURABLE MEDICAL EQUIPMENT

## (undated) DEVICE — FORMALIN CUPS 10% BUFFERED

## (undated) DEVICE — DRSG TEGADERM 4X4.75"

## (undated) DEVICE — CATH ELECHMSTAT  INJ 7FR 210CM

## (undated) DEVICE — DRSG DERMABOND 0.7ML

## (undated) DEVICE — TUBING CANNULA SALTER LABS NASAL ADULT 7FT

## (undated) DEVICE — DRSG CURITY GAUZE SPONGE 4 X 4" 12-PLY

## (undated) DEVICE — TUBING IV SET GRAVITY 3Y 100" MACRO

## (undated) DEVICE — SUT MAXON 2-0 30" GS-22

## (undated) DEVICE — TUBE O2 SUPL CRUSH RESIS CONN SOUTHSIDE ONLY

## (undated) DEVICE — SOL IRR POUR NS 0.9% 1000ML

## (undated) DEVICE — VENODYNE/SCD SLEEVE CALF MEDIUM

## (undated) DEVICE — SOL IRR NS 0.9% 250ML

## (undated) DEVICE — SYR LUER LOK 30CC

## (undated) DEVICE — SYR IV POSIFLUSH NS 3ML 30/TY

## (undated) DEVICE — SOL IRR POUR H2O 500ML

## (undated) DEVICE — SYR ALLIANCE II INFLATION 60ML

## (undated) DEVICE — CATH IV SAFE BC 20G X 1.16" (PINK)

## (undated) DEVICE — TUBING IV SET SECOND 34" W/O LOK-BLUNT

## (undated) DEVICE — BRUSH CYTO ENDO

## (undated) DEVICE — PACK MINOR WITH LAP

## (undated) DEVICE — SUCTION YANKAUER TAPERED BULBOUS NO VENT

## (undated) DEVICE — SENSOR O2 FINGER XL ADULT 24/BX 6BX/CA

## (undated) DEVICE — DRAIN PENROSE .5" X 18" LATEX

## (undated) DEVICE — PLV/PSP-ESU FORCEFX F8I7418A: Type: DURABLE MEDICAL EQUIPMENT

## (undated) DEVICE — ELCTR BOVIE TIP BLADE INSULATED 2.75" EDGE

## (undated) DEVICE — PACK IV START WITH CHG

## (undated) DEVICE — SUT POLYSORB 2-0 30" V-20 UNDYED

## (undated) DEVICE — NDL INJ SCLERO INTERJECT 23G

## (undated) DEVICE — CATH ELCTR GLIDE PRB 7FR

## (undated) DEVICE — DRAPE TOWEL BLUE 17" X 24"

## (undated) DEVICE — VENODYNE/SCD SLEEVE CALF LARGE

## (undated) DEVICE — MASK O2 NON REBREATH 3IN1 ADULT

## (undated) DEVICE — SUT MONOCRYL 3-0 18" PS-2 UNDYED

## (undated) DEVICE — DRAPE INSTRUMENT POUCH 6.75" X 11"

## (undated) DEVICE — BITE BLOCK MAXI RUBBER STAMP

## (undated) DEVICE — GLV 7 PROTEXIS (WHITE)

## (undated) DEVICE — WARMING BLANKET UPPER ADULT

## (undated) DEVICE — BRUSH COLONOSCOPY CYTOLOGY

## (undated) DEVICE — TRAP QUICK CATCH  SINGL CHAMBER

## (undated) DEVICE — SET IV PUMP BLOOD 1VALVE 180FILTER NON-DEHP

## (undated) DEVICE — CANISTER SUCTION 1200CC 10/SL

## (undated) DEVICE — SNARE POLYP SENS 27MM 240CM

## (undated) DEVICE — MARKER ENDO SPOT EX

## (undated) DEVICE — TUBING SUCTION CONN 6FT STERILE